# Patient Record
Sex: FEMALE | Race: WHITE | NOT HISPANIC OR LATINO | Employment: OTHER | URBAN - METROPOLITAN AREA
[De-identification: names, ages, dates, MRNs, and addresses within clinical notes are randomized per-mention and may not be internally consistent; named-entity substitution may affect disease eponyms.]

---

## 2023-04-21 ENCOUNTER — APPOINTMENT (OUTPATIENT)
Dept: GENERAL RADIOLOGY | Facility: CLINIC | Age: 88
End: 2023-04-21
Attending: EMERGENCY MEDICINE
Payer: COMMERCIAL

## 2023-04-21 ENCOUNTER — HOSPITAL ENCOUNTER (EMERGENCY)
Facility: CLINIC | Age: 88
Discharge: HOME | End: 2023-04-21
Attending: EMERGENCY MEDICINE | Admitting: EMERGENCY MEDICINE
Payer: COMMERCIAL

## 2023-04-21 VITALS
SYSTOLIC BLOOD PRESSURE: 176 MMHG | TEMPERATURE: 98.6 F | RESPIRATION RATE: 21 BRPM | OXYGEN SATURATION: 94 % | HEART RATE: 88 BPM | DIASTOLIC BLOOD PRESSURE: 95 MMHG

## 2023-04-21 DIAGNOSIS — R06.2 WHEEZING: ICD-10-CM

## 2023-04-21 LAB
ANION GAP SERPL CALCULATED.3IONS-SCNC: 11 MMOL/L (ref 7–15)
ATRIAL RATE - MUSE: 85 BPM
BASOPHILS # BLD AUTO: 0.1 10E3/UL (ref 0–0.2)
BASOPHILS NFR BLD AUTO: 1 %
BUN SERPL-MCNC: 23.8 MG/DL (ref 8–23)
CALCIUM SERPL-MCNC: 9.5 MG/DL (ref 8.8–10.2)
CHLORIDE SERPL-SCNC: 107 MMOL/L (ref 98–107)
CPB POCT: NO
CREAT SERPL-MCNC: 0.98 MG/DL (ref 0.51–0.95)
DEPRECATED HCO3 PLAS-SCNC: 29 MMOL/L (ref 22–29)
DIASTOLIC BLOOD PRESSURE - MUSE: NORMAL MMHG
EOSINOPHIL # BLD AUTO: 0.2 10E3/UL (ref 0–0.7)
EOSINOPHIL NFR BLD AUTO: 3 %
ERYTHROCYTE [DISTWIDTH] IN BLOOD BY AUTOMATED COUNT: 14.4 % (ref 10–15)
FLUAV RNA SPEC QL NAA+PROBE: NEGATIVE
FLUBV RNA RESP QL NAA+PROBE: NEGATIVE
GFR SERPL CREATININE-BSD FRML MDRD: 55 ML/MIN/1.73M2
GLUCOSE SERPL-MCNC: 115 MG/DL (ref 70–99)
HCO3 BLDV-SCNC: 31 MMOL/L (ref 21–28)
HCT VFR BLD AUTO: 50.4 % (ref 35–47)
HCT VFR BLD CALC: 47 % (ref 35–47)
HGB BLD-MCNC: 15.6 G/DL (ref 11.7–15.7)
HGB BLD-MCNC: 16 G/DL (ref 11.7–15.7)
IMM GRANULOCYTES # BLD: 0 10E3/UL
IMM GRANULOCYTES NFR BLD: 0 %
INTERPRETATION ECG - MUSE: NORMAL
LYMPHOCYTES # BLD AUTO: 1.3 10E3/UL (ref 0.8–5.3)
LYMPHOCYTES NFR BLD AUTO: 17 %
MCH RBC QN AUTO: 32 PG (ref 26.5–33)
MCHC RBC AUTO-ENTMCNC: 31 G/DL (ref 31.5–36.5)
MCV RBC AUTO: 103 FL (ref 78–100)
MONOCYTES # BLD AUTO: 0.7 10E3/UL (ref 0–1.3)
MONOCYTES NFR BLD AUTO: 9 %
NEUTROPHILS # BLD AUTO: 5.3 10E3/UL (ref 1.6–8.3)
NEUTROPHILS NFR BLD AUTO: 70 %
NRBC # BLD AUTO: 0 10E3/UL
NRBC BLD AUTO-RTO: 0 /100
P AXIS - MUSE: 14 DEGREES
PCO2 BLDV: 52 MM HG (ref 40–50)
PH BLDV: 7.39 [PH] (ref 7.32–7.43)
PLATELET # BLD AUTO: 273 10E3/UL (ref 150–450)
PO2 BLDV: 18 MM HG (ref 25–47)
POTASSIUM BLD-SCNC: 4.4 MMOL/L (ref 3.4–5.3)
POTASSIUM SERPL-SCNC: 4.3 MMOL/L (ref 3.4–5.3)
PR INTERVAL - MUSE: 158 MS
QRS DURATION - MUSE: 82 MS
QT - MUSE: 354 MS
QTC - MUSE: 421 MS
R AXIS - MUSE: -40 DEGREES
RBC # BLD AUTO: 4.88 10E6/UL (ref 3.8–5.2)
RSV RNA SPEC NAA+PROBE: NEGATIVE
SAO2 % BLDV: 25 % (ref 94–100)
SARS-COV-2 RNA RESP QL NAA+PROBE: NEGATIVE
SODIUM BLD-SCNC: 147 MMOL/L (ref 133–144)
SODIUM SERPL-SCNC: 147 MMOL/L (ref 136–145)
SYSTOLIC BLOOD PRESSURE - MUSE: NORMAL MMHG
T AXIS - MUSE: 90 DEGREES
VENTRICULAR RATE- MUSE: 85 BPM
WBC # BLD AUTO: 7.6 10E3/UL (ref 4–11)

## 2023-04-21 PROCEDURE — 71046 X-RAY EXAM CHEST 2 VIEWS: CPT

## 2023-04-21 PROCEDURE — 250N000013 HC RX MED GY IP 250 OP 250 PS 637: Performed by: EMERGENCY MEDICINE

## 2023-04-21 PROCEDURE — 84295 ASSAY OF SERUM SODIUM: CPT

## 2023-04-21 PROCEDURE — 250N000012 HC RX MED GY IP 250 OP 636 PS 637: Performed by: EMERGENCY MEDICINE

## 2023-04-21 PROCEDURE — 36415 COLL VENOUS BLD VENIPUNCTURE: CPT | Performed by: EMERGENCY MEDICINE

## 2023-04-21 PROCEDURE — C9803 HOPD COVID-19 SPEC COLLECT: HCPCS

## 2023-04-21 PROCEDURE — 85025 COMPLETE CBC W/AUTO DIFF WBC: CPT | Performed by: EMERGENCY MEDICINE

## 2023-04-21 PROCEDURE — 99285 EMERGENCY DEPT VISIT HI MDM: CPT | Mod: 25,CS

## 2023-04-21 PROCEDURE — 80048 BASIC METABOLIC PNL TOTAL CA: CPT | Performed by: EMERGENCY MEDICINE

## 2023-04-21 PROCEDURE — 87637 SARSCOV2&INF A&B&RSV AMP PRB: CPT | Performed by: EMERGENCY MEDICINE

## 2023-04-21 PROCEDURE — 93005 ELECTROCARDIOGRAM TRACING: CPT

## 2023-04-21 RX ORDER — PREDNISONE 20 MG/1
40 TABLET ORAL DAILY
Qty: 10 TABLET | Refills: 0 | Status: SHIPPED | OUTPATIENT
Start: 2023-04-21 | End: 2023-04-26

## 2023-04-21 RX ORDER — ALBUTEROL SULFATE 90 UG/1
2 AEROSOL, METERED RESPIRATORY (INHALATION) EVERY 4 HOURS PRN
Qty: 18 G | Refills: 0 | Status: SHIPPED | OUTPATIENT
Start: 2023-04-21

## 2023-04-21 RX ORDER — PREDNISONE 20 MG/1
40 TABLET ORAL ONCE
Status: COMPLETED | OUTPATIENT
Start: 2023-04-21 | End: 2023-04-21

## 2023-04-21 RX ORDER — ACETAMINOPHEN 325 MG/1
650 TABLET ORAL ONCE
Status: COMPLETED | OUTPATIENT
Start: 2023-04-21 | End: 2023-04-21

## 2023-04-21 RX ADMIN — PREDNISONE 40 MG: 20 TABLET ORAL at 03:57

## 2023-04-21 RX ADMIN — ACETAMINOPHEN 650 MG: 325 TABLET ORAL at 03:57

## 2023-04-21 ASSESSMENT — ENCOUNTER SYMPTOMS
APPETITE CHANGE: 0
COUGH: 1
FEVER: 0
WHEEZING: 1
SHORTNESS OF BREATH: 1

## 2023-04-21 ASSESSMENT — ACTIVITIES OF DAILY LIVING (ADL): ADLS_ACUITY_SCORE: 35

## 2023-04-21 NOTE — ED PROVIDER NOTES
History     Chief Complaint:  Shortness of Breath       HPI   Eros Ontiveros is a 89 year old female who presents with wheezing and shortness of breath. Eros states that about two weeks ago she noticed some shortness of breath and wheezing. She was given a prednisone treatment that she notes helped somewhat, but still reports intermittent wheezing. Earlier this evening, Eros's neighbors called EMS for Eros's  and upon their arrival EMS report that Eros was wheezing, thus prompting them to call another team for her. En route, EMS did administer one breathing treatment. During evaluation, Eros reports that the breathing treatment given did help. She also notes that she's been having a cough over the last couple of weeks but otherwise denies any fever, change in appetite or fluid intake, history of lung issues, tobacco use, or use of oxygen at baseline. Of note, Eros did receive vaccinations for COVID and pneumonia on 4/20.       Independent Historian:   EMS supplement as above    Review of External Notes: FM note 4/4/23 discussing wheezing     ROS:  Review of Systems   Constitutional: Negative for appetite change and fever.   Respiratory: Positive for cough, shortness of breath and wheezing.    All other systems reviewed and are negative.      Allergies:  Penicillins     Medications:    Allopurinol  Indomethacin     Past Medical History:    Arthritis  Opioid dependence  Obesity    Past Surgical History:    Eye surgery  Right ear skin cancer excision     Social History:  Patient arrived via EMS.  Patient is unaccompanied in the ED.  Patient denies tobacco use.  PCP: Damir Collins     Physical Exam     Patient Vitals for the past 24 hrs:   BP Temp Temp src Pulse Resp SpO2   04/21/23 0522 -- -- -- -- 21 94 %   04/21/23 0517 (!) 176/95 -- -- 88 -- --   04/21/23 0438 -- -- -- -- -- 94 %   04/21/23 0418 (!) 179/91 -- -- 81 15 --   04/21/23 0338 (!) 165/91 98.6  F (37  C) Oral 86 23 93 %         Physical Exam  General: Appears well-developed and well-nourished.   Head: No signs of trauma.   CV: Normal rate and regular rhythm.    Resp: Effort normal and breath sounds normal. No respiratory distress.   GI: Soft. There is no tenderness.  No rebound or guarding.  Normal bowel sounds.    MSK: Normal range of motion. no edema. No Calf tenderness.  Neuro: The patient is alert and oriented. Speech normal.  Skin: Skin is warm and dry. No rash noted.   Psych: normal mood and affect. behavior is normal.       Emergency Department Course   ECG  ECG taken at 0346, ECG read at 0352  Sinus rhythm with premature atrial complexes  Possible left atrial enlargement  Left axis deviation  Inferior infarct, age undetermined  Abnormal ECG   Rate 85 bpm. UT interval 158 ms. QRS duration 82 ms. QT/QTc 354/421 ms. P-R-T axes 14 -40 90.     Imaging:  XR Chest 2 Views   Final Result   IMPRESSION: Elevated right hemidiaphragm. Bibasilar atelectasis. No significant pleural effusion. No edema. No pneumothorax. Mildly enlarged cardiac silhouette.         Report per radiology    Laboratory:  Labs Ordered and Resulted from Time of ED Arrival to Time of ED Departure   BASIC METABOLIC PANEL - Abnormal       Result Value    Sodium 147 (*)     Potassium 4.3      Chloride 107      Carbon Dioxide (CO2) 29      Anion Gap 11      Urea Nitrogen 23.8 (*)     Creatinine 0.98 (*)     Calcium 9.5      Glucose 115 (*)     GFR Estimate 55 (*)    CBC WITH PLATELETS AND DIFFERENTIAL - Abnormal    WBC Count 7.6      RBC Count 4.88      Hemoglobin 15.6      Hematocrit 50.4 (*)      (*)     MCH 32.0      MCHC 31.0 (*)     RDW 14.4      Platelet Count 273      % Neutrophils 70      % Lymphocytes 17      % Monocytes 9      % Eosinophils 3      % Basophils 1      % Immature Granulocytes 0      NRBCs per 100 WBC 0      Absolute Neutrophils 5.3      Absolute Lymphocytes 1.3      Absolute Monocytes 0.7      Absolute Eosinophils 0.2      Absolute Basophils  0.1      Absolute Immature Granulocytes 0.0      Absolute NRBCs 0.0     ISTAT GASES ELECTROLYTES VENOUS POCT - Abnormal    CPB Applied No      Hematocrit POCT 47      Bicarbonate Venous POCT 31 (*)     Hemoglobin POCT 16.0 (*)     Potassium POCT 4.4      Sodium POCT 147 (*)     pCO2 Venous POCT 52 (*)     pH Venous POCT 7.39      pO2 Venous POCT 18 (*)     O2 Sat, Venous POCT 25 (*)    INFLUENZA A/B, RSV, & SARS-COV2 PCR - Normal    Influenza A PCR Negative      Influenza B PCR Negative      RSV PCR Negative      SARS CoV2 PCR Negative          Procedures   none    Emergency Department Course & Assessments:       Interventions:  Medications   acetaminophen (TYLENOL) tablet 650 mg (650 mg Oral $Given 4/21/23 0357)   predniSONE (DELTASONE) tablet 40 mg (40 mg Oral $Given 4/21/23 0357)        Assessments:  0339 I obtained history and examined the patient as noted above.   0528 I rechecked and updated the patient. At this time, the patient was deemed safe to discharge home and she agreed to the plan of care.    Independent Interpretation (X-rays, CTs, rhythm strip):  I independently reviewed the patient's chest X-ray and found no pneumothorax.     Consultations/Discussion of Management or Tests:  None        Social Determinants of Health affecting care:   None    Disposition:  The patient was discharged to home.     Impression & Plan    CMS Diagnoses: None      Medical Decision Making:  Eros Ontiveros is an 89-year-old woman who presents due to wheezing and shortness of breath. An ambulance had actually been called for her , but the EMS crew had noted that she seemed somewhat short breath and was having some wheezing so she was transported as well.  Patient had recently had a viral upper respiratory infection and completed a course of prednisone and had been doing better.  Yesterday she received vaccines.  At the time of my evaluation the patient appeared well.  She had received a breathing treatment with EMS and  she states that this had helped.  Her vital signs were appropriate. I did obtain screening blood work along with a chest x-ray, EKG, and COVID swab and these were all reassuring. The patient was not having any CO2 retention.  I believe that she is likely having some reactive airway disease secondary to the vaccines.  I do not believe this is an allergic reaction or other life-threatening process.  Patient was discharged home with a prescription for an albuterol inhaler.  I also gave a prescription for some additional prednisone, but discussed holding onto it as her symptoms may resolve quite quickly if it is related to the vaccine, but if they do persist in the next days, she will have that available to her.  She is recommended follow-up in clinic return to the ER for any further concerns    Diagnosis:    ICD-10-CM    1. Wheezing  R06.2            Discharge Medications:  Discharge Medication List as of 4/21/2023  5:28 AM      START taking these medications    Details   albuterol (PROAIR HFA) 108 (90 Base) MCG/ACT inhaler Inhale 2 puffs into the lungs every 4 hours as needed for shortness of breath, Disp-18 g, R-0, E-Prescribe      predniSONE (DELTASONE) 20 MG tablet Take 2 tablets (40 mg) by mouth daily for 5 days, Disp-10 tablet, R-0, Local Print              Scribe Disclosure:  I, Greer Smith, am serving as a scribe at 3:43 AM on 4/21/2023 to document services personally performed by Damir Rizvi MD based on my observations and the provider's statements to me.     4/21/2023   Damir Rizvi MD Bergenstal, John A, MD  04/21/23 0990

## 2023-04-21 NOTE — DISCHARGE INSTRUCTIONS
I believe your symptoms are related to your vaccine.  Use the inhaler every 4 hours as needed for wheezing or shortness of breath.  If the wheezing continues, you can fill the prednisone for continued relief.  Return to the ER for worsening breathing or any further concerns.

## 2023-04-21 NOTE — ED NOTES
Bed: [facility]  Expected date: 4/21/23  Expected time: 3:35 AM  Means of arrival: Ambulance  Comments:  Ike [phone] [id number] resp. distress

## 2023-04-21 NOTE — ED TRIAGE NOTES
Pt BIBA from home. She received the covid booster and pneumonia vaccine yesterday. Her , who also got the booster, got up in the middle of the night to use the bathroom and their neighbors called 911 for him because he was c/o SOB. On EMS arrival they noted her to have inspiratory and expiratory wheezes so another rig was called for her. Nebulizer given en route with some relief. Pt states she has not had SOB just a little wheezing and cough. VSS

## 2024-01-11 ENCOUNTER — APPOINTMENT (OUTPATIENT)
Dept: GENERAL RADIOLOGY | Facility: CLINIC | Age: 89
DRG: 193 | End: 2024-01-11
Attending: EMERGENCY MEDICINE
Payer: COMMERCIAL

## 2024-01-11 ENCOUNTER — APPOINTMENT (OUTPATIENT)
Dept: CARDIOLOGY | Facility: CLINIC | Age: 89
DRG: 193 | End: 2024-01-11
Payer: COMMERCIAL

## 2024-01-11 ENCOUNTER — APPOINTMENT (OUTPATIENT)
Dept: CT IMAGING | Facility: CLINIC | Age: 89
DRG: 193 | End: 2024-01-11
Payer: COMMERCIAL

## 2024-01-11 ENCOUNTER — HOSPITAL ENCOUNTER (INPATIENT)
Facility: CLINIC | Age: 89
LOS: 11 days | Discharge: HOME HEALTH | DRG: 193 | End: 2024-01-22
Attending: EMERGENCY MEDICINE | Admitting: STUDENT IN AN ORGANIZED HEALTH CARE EDUCATION/TRAINING PROGRAM
Payer: COMMERCIAL

## 2024-01-11 DIAGNOSIS — J96.01 ACUTE RESPIRATORY FAILURE WITH HYPOXIA AND HYPERCAPNIA (H): ICD-10-CM

## 2024-01-11 DIAGNOSIS — J96.02 ACUTE RESPIRATORY FAILURE WITH HYPOXIA AND HYPERCAPNIA (H): ICD-10-CM

## 2024-01-11 DIAGNOSIS — J18.9 COMMUNITY ACQUIRED PNEUMONIA, BILATERAL: ICD-10-CM

## 2024-01-11 LAB
ALBUMIN SERPL BCG-MCNC: 3.2 G/DL (ref 3.5–5.2)
ALBUMIN UR-MCNC: NEGATIVE MG/DL
ALP SERPL-CCNC: 80 U/L (ref 40–150)
ALT SERPL W P-5'-P-CCNC: 28 U/L (ref 0–50)
ANION GAP SERPL CALCULATED.3IONS-SCNC: 7 MMOL/L (ref 7–15)
APPEARANCE UR: CLEAR
AST SERPL W P-5'-P-CCNC: 87 U/L (ref 0–45)
ATRIAL RATE - MUSE: 73 BPM
BASE EXCESS BLDV CALC-SCNC: 9 MMOL/L (ref -7.7–1.9)
BASOPHILS # BLD AUTO: 0 10E3/UL (ref 0–0.2)
BASOPHILS NFR BLD AUTO: 0 %
BILIRUB SERPL-MCNC: 0.4 MG/DL
BILIRUB UR QL STRIP: NEGATIVE
BUN SERPL-MCNC: 41.5 MG/DL (ref 8–23)
CALCIUM SERPL-MCNC: 9 MG/DL (ref 8.2–9.6)
CHLORIDE SERPL-SCNC: 109 MMOL/L (ref 98–107)
COLOR UR AUTO: ABNORMAL
CREAT SERPL-MCNC: 1.28 MG/DL (ref 0.51–0.95)
DEPRECATED HCO3 PLAS-SCNC: 33 MMOL/L (ref 22–29)
DIASTOLIC BLOOD PRESSURE - MUSE: NORMAL MMHG
EGFRCR SERPLBLD CKD-EPI 2021: 40 ML/MIN/1.73M2
EOSINOPHIL # BLD AUTO: 0 10E3/UL (ref 0–0.7)
EOSINOPHIL NFR BLD AUTO: 0 %
ERYTHROCYTE [DISTWIDTH] IN BLOOD BY AUTOMATED COUNT: 16.9 % (ref 10–15)
FLUAV RNA SPEC QL NAA+PROBE: NEGATIVE
FLUBV RNA RESP QL NAA+PROBE: NEGATIVE
GLUCOSE BLDC GLUCOMTR-MCNC: 129 MG/DL (ref 70–99)
GLUCOSE BLDC GLUCOMTR-MCNC: 64 MG/DL (ref 70–99)
GLUCOSE SERPL-MCNC: 102 MG/DL (ref 70–99)
GLUCOSE UR STRIP-MCNC: NEGATIVE MG/DL
HCO3 BLDV-SCNC: 35 MMOL/L (ref 21–28)
HCO3 BLDV-SCNC: 35 MMOL/L (ref 21–28)
HCO3 BLDV-SCNC: 36 MMOL/L (ref 21–28)
HCO3 BLDV-SCNC: 37 MMOL/L (ref 21–28)
HCO3 BLDV-SCNC: 38 MMOL/L (ref 21–28)
HCT VFR BLD AUTO: 43.6 % (ref 35–47)
HGB BLD-MCNC: 12.7 G/DL (ref 11.7–15.7)
HGB UR QL STRIP: ABNORMAL
IMM GRANULOCYTES # BLD: 0.5 10E3/UL
IMM GRANULOCYTES NFR BLD: 4 %
INTERPRETATION ECG - MUSE: NORMAL
KETONES UR STRIP-MCNC: NEGATIVE MG/DL
LACTATE BLD-SCNC: 0.9 MMOL/L
LACTATE BLD-SCNC: 1 MMOL/L
LACTATE BLD-SCNC: 1 MMOL/L
LACTATE BLD-SCNC: 1.4 MMOL/L
LEUKOCYTE ESTERASE UR QL STRIP: NEGATIVE
LVEF ECHO: NORMAL
LYMPHOCYTES # BLD AUTO: 0.7 10E3/UL (ref 0.8–5.3)
LYMPHOCYTES NFR BLD AUTO: 6 %
MCH RBC QN AUTO: 31.1 PG (ref 26.5–33)
MCHC RBC AUTO-ENTMCNC: 29.1 G/DL (ref 31.5–36.5)
MCV RBC AUTO: 107 FL (ref 78–100)
MONOCYTES # BLD AUTO: 0.9 10E3/UL (ref 0–1.3)
MONOCYTES NFR BLD AUTO: 7 %
NEUTROPHILS # BLD AUTO: 9.8 10E3/UL (ref 1.6–8.3)
NEUTROPHILS NFR BLD AUTO: 83 %
NITRATE UR QL: NEGATIVE
NRBC # BLD AUTO: 0.3 10E3/UL
NRBC BLD AUTO-RTO: 2 /100
NT-PROBNP SERPL-MCNC: 9857 PG/ML (ref 0–1800)
O2/TOTAL GAS SETTING VFR VENT: 40 %
P AXIS - MUSE: 45 DEGREES
PCO2 BLDV: 68 MM HG (ref 40–50)
PCO2 BLDV: 69 MM HG (ref 40–50)
PCO2 BLDV: 73 MM HG (ref 40–50)
PCO2 BLDV: 77 MM HG (ref 40–50)
PCO2 BLDV: 80 MM HG (ref 40–50)
PH BLDV: 7.25 [PH] (ref 7.32–7.43)
PH BLDV: 7.26 [PH] (ref 7.32–7.43)
PH BLDV: 7.32 [PH] (ref 7.32–7.43)
PH BLDV: 7.32 [PH] (ref 7.32–7.43)
PH BLDV: 7.35 [PH] (ref 7.32–7.43)
PH UR STRIP: 5 [PH] (ref 5–7)
PLATELET # BLD AUTO: 576 10E3/UL (ref 150–450)
PO2 BLDV: 22 MM HG (ref 25–47)
PO2 BLDV: 22 MM HG (ref 25–47)
PO2 BLDV: 25 MM HG (ref 25–47)
PO2 BLDV: 39 MM HG (ref 25–47)
PO2 BLDV: 40 MM HG (ref 25–47)
POTASSIUM SERPL-SCNC: 4.6 MMOL/L (ref 3.4–5.3)
PR INTERVAL - MUSE: 152 MS
PROCALCITONIN SERPL IA-MCNC: 0.37 NG/ML
PROT SERPL-MCNC: 7.7 G/DL (ref 6.4–8.3)
QRS DURATION - MUSE: 84 MS
QT - MUSE: 412 MS
QTC - MUSE: 453 MS
R AXIS - MUSE: -62 DEGREES
RBC # BLD AUTO: 4.08 10E6/UL (ref 3.8–5.2)
RBC URINE: 4 /HPF
RSV RNA SPEC NAA+PROBE: NEGATIVE
SAO2 % BLDV: 31 % (ref 94–100)
SAO2 % BLDV: 34 % (ref 94–100)
SAO2 % BLDV: 62 % (ref 94–100)
SAO2 % BLDV: 68 % (ref 94–100)
SARS-COV-2 RNA RESP QL NAA+PROBE: NEGATIVE
SODIUM SERPL-SCNC: 149 MMOL/L (ref 135–145)
SP GR UR STRIP: 1.02 (ref 1–1.03)
SQUAMOUS EPITHELIAL: <1 /HPF
SYSTOLIC BLOOD PRESSURE - MUSE: NORMAL MMHG
T AXIS - MUSE: 99 DEGREES
T4 FREE SERPL-MCNC: 0.69 NG/DL (ref 0.9–1.7)
TROPONIN T SERPL HS-MCNC: 125 NG/L
TROPONIN T SERPL HS-MCNC: 129 NG/L
TROPONIN T SERPL HS-MCNC: 146 NG/L
TSH SERPL DL<=0.005 MIU/L-ACNC: 5.2 UIU/ML (ref 0.3–4.2)
UROBILINOGEN UR STRIP-MCNC: NORMAL MG/DL
VENTRICULAR RATE- MUSE: 73 BPM
VIT B12 SERPL-MCNC: 494 PG/ML (ref 232–1245)
WBC # BLD AUTO: 11.9 10E3/UL (ref 4–11)
WBC URINE: 1 /HPF

## 2024-01-11 PROCEDURE — 84443 ASSAY THYROID STIM HORMONE: CPT

## 2024-01-11 PROCEDURE — 84484 ASSAY OF TROPONIN QUANT: CPT

## 2024-01-11 PROCEDURE — 258N000001 HC RX 258: Performed by: HOSPITALIST

## 2024-01-11 PROCEDURE — 84439 ASSAY OF FREE THYROXINE: CPT

## 2024-01-11 PROCEDURE — 36415 COLL VENOUS BLD VENIPUNCTURE: CPT

## 2024-01-11 PROCEDURE — 74177 CT ABD & PELVIS W/CONTRAST: CPT

## 2024-01-11 PROCEDURE — 99223 1ST HOSP IP/OBS HIGH 75: CPT

## 2024-01-11 PROCEDURE — 93306 TTE W/DOPPLER COMPLETE: CPT | Mod: 26 | Performed by: INTERNAL MEDICINE

## 2024-01-11 PROCEDURE — 999N000185 HC STATISTIC TRANSPORT TIME EA 15 MIN

## 2024-01-11 PROCEDURE — 83880 ASSAY OF NATRIURETIC PEPTIDE: CPT | Performed by: EMERGENCY MEDICINE

## 2024-01-11 PROCEDURE — 99223 1ST HOSP IP/OBS HIGH 75: CPT | Mod: FS | Performed by: NURSE PRACTITIONER

## 2024-01-11 PROCEDURE — 5A09357 ASSISTANCE WITH RESPIRATORY VENTILATION, LESS THAN 24 CONSECUTIVE HOURS, CONTINUOUS POSITIVE AIRWAY PRESSURE: ICD-10-PCS | Performed by: STUDENT IN AN ORGANIZED HEALTH CARE EDUCATION/TRAINING PROGRAM

## 2024-01-11 PROCEDURE — 96365 THER/PROPH/DIAG IV INF INIT: CPT | Mod: 59

## 2024-01-11 PROCEDURE — 258N000002 HC RX IP 258 OP 250

## 2024-01-11 PROCEDURE — 999N000208 ECHOCARDIOGRAM COMPLETE

## 2024-01-11 PROCEDURE — 80053 COMPREHEN METABOLIC PANEL: CPT | Performed by: EMERGENCY MEDICINE

## 2024-01-11 PROCEDURE — 36415 COLL VENOUS BLD VENIPUNCTURE: CPT | Performed by: EMERGENCY MEDICINE

## 2024-01-11 PROCEDURE — 94660 CPAP INITIATION&MGMT: CPT

## 2024-01-11 PROCEDURE — 250N000011 HC RX IP 250 OP 636: Performed by: STUDENT IN AN ORGANIZED HEALTH CARE EDUCATION/TRAINING PROGRAM

## 2024-01-11 PROCEDURE — 96367 TX/PROPH/DG ADDL SEQ IV INF: CPT

## 2024-01-11 PROCEDURE — 82607 VITAMIN B-12: CPT

## 2024-01-11 PROCEDURE — 87040 BLOOD CULTURE FOR BACTERIA: CPT

## 2024-01-11 PROCEDURE — 81001 URINALYSIS AUTO W/SCOPE: CPT

## 2024-01-11 PROCEDURE — 82803 BLOOD GASES ANY COMBINATION: CPT

## 2024-01-11 PROCEDURE — 255N000002 HC RX 255 OP 636: Performed by: STUDENT IN AN ORGANIZED HEALTH CARE EDUCATION/TRAINING PROGRAM

## 2024-01-11 PROCEDURE — 84484 ASSAY OF TROPONIN QUANT: CPT | Performed by: HOSPITALIST

## 2024-01-11 PROCEDURE — 250N000009 HC RX 250: Performed by: STUDENT IN AN ORGANIZED HEALTH CARE EDUCATION/TRAINING PROGRAM

## 2024-01-11 PROCEDURE — 84145 PROCALCITONIN (PCT): CPT

## 2024-01-11 PROCEDURE — 85025 COMPLETE CBC W/AUTO DIFF WBC: CPT | Performed by: EMERGENCY MEDICINE

## 2024-01-11 PROCEDURE — 87637 SARSCOV2&INF A&B&RSV AMP PRB: CPT | Performed by: EMERGENCY MEDICINE

## 2024-01-11 PROCEDURE — 999N000157 HC STATISTIC RCP TIME EA 10 MIN

## 2024-01-11 PROCEDURE — 84484 ASSAY OF TROPONIN QUANT: CPT | Performed by: EMERGENCY MEDICINE

## 2024-01-11 PROCEDURE — 87040 BLOOD CULTURE FOR BACTERIA: CPT | Performed by: EMERGENCY MEDICINE

## 2024-01-11 PROCEDURE — 99285 EMERGENCY DEPT VISIT HI MDM: CPT | Mod: 25

## 2024-01-11 PROCEDURE — 99207 PR NO BILLABLE SERVICE THIS VISIT: CPT | Performed by: STUDENT IN AN ORGANIZED HEALTH CARE EDUCATION/TRAINING PROGRAM

## 2024-01-11 PROCEDURE — 250N000011 HC RX IP 250 OP 636: Performed by: EMERGENCY MEDICINE

## 2024-01-11 PROCEDURE — 71045 X-RAY EXAM CHEST 1 VIEW: CPT

## 2024-01-11 PROCEDURE — 120N000013 HC R&B IMCU

## 2024-01-11 PROCEDURE — 93005 ELECTROCARDIOGRAM TRACING: CPT

## 2024-01-11 PROCEDURE — 250N000011 HC RX IP 250 OP 636

## 2024-01-11 RX ORDER — AZITHROMYCIN 500 MG/1
500 INJECTION, POWDER, LYOPHILIZED, FOR SOLUTION INTRAVENOUS ONCE
Status: COMPLETED | OUTPATIENT
Start: 2024-01-11 | End: 2024-01-11

## 2024-01-11 RX ORDER — ACETAMINOPHEN 650 MG/1
650 SUPPOSITORY RECTAL EVERY 4 HOURS PRN
Status: DISCONTINUED | OUTPATIENT
Start: 2024-01-11 | End: 2024-01-22 | Stop reason: HOSPADM

## 2024-01-11 RX ORDER — LIDOCAINE 40 MG/G
CREAM TOPICAL
Status: DISCONTINUED | OUTPATIENT
Start: 2024-01-11 | End: 2024-01-14

## 2024-01-11 RX ORDER — LIDOCAINE 40 MG/G
CREAM TOPICAL
Status: DISCONTINUED | OUTPATIENT
Start: 2024-01-11 | End: 2024-01-22 | Stop reason: HOSPADM

## 2024-01-11 RX ORDER — CARBOXYMETHYLCELLULOSE SODIUM 5 MG/ML
1 SOLUTION/ DROPS OPHTHALMIC
Status: DISCONTINUED | OUTPATIENT
Start: 2024-01-11 | End: 2024-01-22 | Stop reason: HOSPADM

## 2024-01-11 RX ORDER — GUAIFENESIN 200 MG/10ML
200 LIQUID ORAL EVERY 4 HOURS PRN
Status: DISCONTINUED | OUTPATIENT
Start: 2024-01-11 | End: 2024-01-22 | Stop reason: HOSPADM

## 2024-01-11 RX ORDER — CEFTRIAXONE 1 G/1
1 INJECTION, POWDER, FOR SOLUTION INTRAMUSCULAR; INTRAVENOUS EVERY 24 HOURS
Status: DISCONTINUED | OUTPATIENT
Start: 2024-01-12 | End: 2024-01-13

## 2024-01-11 RX ORDER — CEFTRIAXONE 1 G/1
1 INJECTION, POWDER, FOR SOLUTION INTRAMUSCULAR; INTRAVENOUS ONCE
Status: COMPLETED | OUTPATIENT
Start: 2024-01-11 | End: 2024-01-11

## 2024-01-11 RX ORDER — NICOTINE POLACRILEX 4 MG
15-30 LOZENGE BUCCAL
Status: DISCONTINUED | OUTPATIENT
Start: 2024-01-11 | End: 2024-01-22 | Stop reason: HOSPADM

## 2024-01-11 RX ORDER — BENZONATATE 100 MG/1
100 CAPSULE ORAL 3 TIMES DAILY PRN
Status: DISCONTINUED | OUTPATIENT
Start: 2024-01-11 | End: 2024-01-22 | Stop reason: HOSPADM

## 2024-01-11 RX ORDER — CALCIUM CARBONATE 500 MG/1
1000 TABLET, CHEWABLE ORAL 4 TIMES DAILY PRN
Status: DISCONTINUED | OUTPATIENT
Start: 2024-01-11 | End: 2024-01-22 | Stop reason: HOSPADM

## 2024-01-11 RX ORDER — FUROSEMIDE 10 MG/ML
20 INJECTION INTRAMUSCULAR; INTRAVENOUS ONCE
Status: COMPLETED | OUTPATIENT
Start: 2024-01-11 | End: 2024-01-11

## 2024-01-11 RX ORDER — SODIUM CHLORIDE 450 MG/100ML
INJECTION, SOLUTION INTRAVENOUS CONTINUOUS
Status: DISCONTINUED | OUTPATIENT
Start: 2024-01-11 | End: 2024-01-11

## 2024-01-11 RX ORDER — IOPAMIDOL 755 MG/ML
91 INJECTION, SOLUTION INTRAVASCULAR ONCE
Status: COMPLETED | OUTPATIENT
Start: 2024-01-11 | End: 2024-01-11

## 2024-01-11 RX ORDER — ALLOPURINOL 300 MG/1
300 TABLET ORAL DAILY
COMMUNITY

## 2024-01-11 RX ORDER — AMOXICILLIN 250 MG
2 CAPSULE ORAL 2 TIMES DAILY PRN
Status: DISCONTINUED | OUTPATIENT
Start: 2024-01-11 | End: 2024-01-22 | Stop reason: HOSPADM

## 2024-01-11 RX ORDER — NITROGLYCERIN 0.4 MG/1
0.4 TABLET SUBLINGUAL EVERY 5 MIN PRN
Status: DISCONTINUED | OUTPATIENT
Start: 2024-01-11 | End: 2024-01-14

## 2024-01-11 RX ORDER — AMOXICILLIN 250 MG
1 CAPSULE ORAL 2 TIMES DAILY PRN
Status: DISCONTINUED | OUTPATIENT
Start: 2024-01-11 | End: 2024-01-22 | Stop reason: HOSPADM

## 2024-01-11 RX ORDER — LIDOCAINE 40 MG/G
CREAM TOPICAL
Status: CANCELLED | OUTPATIENT
Start: 2024-01-11

## 2024-01-11 RX ORDER — ACETAMINOPHEN 325 MG/1
650 TABLET ORAL EVERY 4 HOURS PRN
Status: DISCONTINUED | OUTPATIENT
Start: 2024-01-11 | End: 2024-01-22 | Stop reason: HOSPADM

## 2024-01-11 RX ORDER — IPRATROPIUM BROMIDE AND ALBUTEROL SULFATE 2.5; .5 MG/3ML; MG/3ML
3 SOLUTION RESPIRATORY (INHALATION)
Status: DISCONTINUED | OUTPATIENT
Start: 2024-01-11 | End: 2024-01-14

## 2024-01-11 RX ORDER — INDOMETHACIN 25 MG/1
25 CAPSULE ORAL 2 TIMES DAILY WITH MEALS
Status: ON HOLD | COMMUNITY
Start: 2023-11-02 | End: 2024-01-21

## 2024-01-11 RX ORDER — DEXTROSE MONOHYDRATE 25 G/50ML
25-50 INJECTION, SOLUTION INTRAVENOUS
Status: DISCONTINUED | OUTPATIENT
Start: 2024-01-11 | End: 2024-01-22 | Stop reason: HOSPADM

## 2024-01-11 RX ADMIN — SODIUM CHLORIDE 68 ML: 9 INJECTION, SOLUTION INTRAVENOUS at 15:13

## 2024-01-11 RX ADMIN — FUROSEMIDE 20 MG: 10 INJECTION, SOLUTION INTRAMUSCULAR; INTRAVENOUS at 16:16

## 2024-01-11 RX ADMIN — AZITHROMYCIN MONOHYDRATE 500 MG: 500 INJECTION, POWDER, LYOPHILIZED, FOR SOLUTION INTRAVENOUS at 11:25

## 2024-01-11 RX ADMIN — IOPAMIDOL 91 ML: 755 INJECTION, SOLUTION INTRAVENOUS at 15:14

## 2024-01-11 RX ADMIN — SODIUM CHLORIDE: 4.5 INJECTION, SOLUTION INTRAVENOUS at 13:56

## 2024-01-11 RX ADMIN — HUMAN ALBUMIN MICROSPHERES AND PERFLUTREN 9 ML: 10; .22 INJECTION, SOLUTION INTRAVENOUS at 13:43

## 2024-01-11 RX ADMIN — DEXTROSE MONOHYDRATE 25 ML: 25 INJECTION, SOLUTION INTRAVENOUS at 22:40

## 2024-01-11 RX ADMIN — CEFTRIAXONE SODIUM 1 G: 1 INJECTION, POWDER, FOR SOLUTION INTRAMUSCULAR; INTRAVENOUS at 10:56

## 2024-01-11 ASSESSMENT — ACTIVITIES OF DAILY LIVING (ADL)
ADLS_ACUITY_SCORE: 35

## 2024-01-11 NOTE — ED NOTES
[facility]  ED Nurse Handoff Report    ED Chief complaint: Shortness of Breath      ED Diagnosis:   Final diagnoses:   Acute respiratory failure with hypoxia (H)   Community acquired pneumonia, bilateral       Code Status: MD to discuss    Allergies:   Allergies   Allergen Reactions    Penicillins Unknown       Patient Story: Patient comes in hypoxic with saturations 60%. According to family she has had SOB and a cough for 3 weeks. EMS had put her on 15L of NRB. Here in the ED she was placed on Bipap.   Focused Assessment:    Cardiac- NSR. BNP and trops are elevated.   Neuro- Somnolent, forgetful, disoriented to time.  Respiratory- SOB, accessory muscle use, on Bipap     Treatments and/or interventions provided: Abx, imaging, lab work, Bipap.   Patient's response to treatments and/or interventions: Tolerated well.    To be done/followed up on inpatient unit:  Per MD orders.    Does this patient have any cognitive concerns?: Forgetful    Activity level - Baseline/Home:  Independent  Activity Level - Current:   Unknown    Patient's Preferred language: English   Needed?: No    Isolation: None  Infection: Not Applicable  Patient tested for COVID 19 prior to admission: YES  Bariatric?: No    Vital Signs:   Vitals:    01/11/24 1030 01/11/24 1100 01/11/24 1130 01/11/24 1200   BP: (!) 153/113 (!) 155/94 (!) 148/103 (!) 143/83   Pulse: 65 60 70 73   Resp: (!) 33 30 (!) 33 (!) 32   Temp:       TempSrc:       SpO2: 98% 94% 96% 94%       Cardiac Rhythm:Cardiac Rhythm: Normal sinus rhythm    Was the PSS-3 completed:   Yes  What interventions are required if any?     N/a          Family Comments:  and daughter at bedside.   OBS brochure/video discussed/provided to patient/family: N/A    For the majority of the shift this patient's behavior was Green.   Behavioral interventions performed were n/a.    ED NURSE PHONE NUMBER: [phone]

## 2024-01-11 NOTE — H&P
[facility]    History and Physical - Hospitalist Service       Date of Admission:  1/11/2024    Assessment & Plan      Eros Ontiveros is a 89 year old female with past medical history significant for arthritis who was admitted on 1/11/2024 for further evaluation of shortness of breath.     Patient is hypertensive upon arrival to the ED with a BP of 147/100 and intermittently tachypneic with a RR 33 requiring 3-5 LPM. EMS report she had oxygenations in 62% ORA upon arrival. She was placed on a 15L non-rebreather en route. She reports three weeks of progressively worsening SOB and productive cough (green sputum). Associated symptoms include worsening lower extremity edema, decreased appetite, fatigue, altered mental status (confusion and disorientation started on 01/08) and hematuria. Denies recent fevers, chest pain, N/V, abdominal pain, bowel changes or recent syncopal episodes. Last bowel movement 01/10. Family unclear if this bowel movement was normal. Granddaughter notes patient was seen in a virtual visit approximately one week ago for her worsening shortness of breath where she was prescribed a Z-pack. She completed the course of antibiotics without relief of her symptoms. Granddaughter states patient has had an unintentional weight loss of 25 lbs since Thanksgiving. She also states patient has a several year history of exertional shortness of breath and LE edema at her baseline that has never been fully evaluated. Patient is noted to have had minimal PO intake since 01/05. Labs in the ED notable for a BNP 9,857. Trop 146. EKG sinus with PACs. Leukocytosis and thrombocytosis noted with a WBC 11.9 and . Negative lactate. BMP notable for a Na 149, Cl 109, Cr 1.28, BUN 41.5, GFR 40, albumin 3.2 and AST 87. VBGs upon arrival notable for a pH 7.32, pCO2 73, Bicarb 38. COVID, Influenza and RSV negative. CXR demonstrated new dense consolidation at the right upper lobe. Increased  consolidation at the left lung base as well that may also represent airspace consolidation. Small bibasilar pleural fluid. Normal cardiac silhouette. Bilateral shoulder DJD. She was started on ceftriaxone and azithromycin in the ED.     Upon chart review, patient appears to have had concerns of cough and wheezing since at least 04/2023. VBGs at that time demonstrated a pH 7.39, pCO2 52, Bicarb 31. She is also noted to have a history of pedal edema for which she was on 20 mg lasix at one point. No known cardiac history, per daughter.     Acute Hypoxic/Hypercapnic Respiratory Failure   Community Acquired Pneumonia, bilateral   Acute Toxic Metabolic encephalopathy   Generalized Weakness   Leukocytosis   *Baseline patient doesn't require supplemental oxygen. Initially found by EMS with SpO2 62% ORA and started on 15L non-rebreather en route. Currently requiring BiPAP. Currently at 40% FiO2.  *Several week history of cough and shortness of breath. Upon chart review, may be closer to a year. WBC 11.9. Lactate negative. CXR demonstrated new dense consolidation at the right upper lobe. Increased consolidation at the left lung base as well that may also represent airspace consolidation. Small bibasilar pleural fluid.   - Admit to IMC.   - Continue ceftriaxone and azithromycin.   - Start BiPAP. Currently at 40% FiO2. Repeat VBG pH 7.25, pCO2 80, Bicarb 35. Again repeated and pH 7.32, pCO2 69 and Bicarb 36. VBGs q 6 hrs or sooner as needed for now.   - RT consulted, appreciate the cares.   - Respiratory assessment score q 4 hours.   - Procalcitonin 0.37. Repeat lactates obtained and negative.   - DuoNebs scheduled, antitussives PRN.   - CT PE 01/11 ordered and demonstrated no pulmonary emboli. Rounded area of consolidation in the right upper lobe measuring about 4.5 cm could represent pneumonia with a possible underlying mass. Additional areas of groundglass and patchy opacities in the right lung, likely due to pneumonia.  Small bilateral pleural effusions. Recommend a follow-up chest CT in 8-12 weeks to ensure resolution.   - Continue to monitor for improvement and consider consulting pulmonology if respiratory status continues to decline. Consider CT chest in 1-2 months to assess mass.   - PT/OT/SW. Granddaughter reports should patient clinically improve, she would like to discuss [facility] longer term. Notes that grandfather would likely disagree and family needs to have discussion about longer term planning in coming days.   - Obtain morning CBC.     Severe LVH w/ No LV Outflow Obstruction   Concern for HCM vs cardiac amyloid  Elevated Troponins - suspect demand ischemia   LE Edema  Hypertension    Moderate Aortic Stenosis   *Patient has no known cardiac history, per family, and takes no cardiac medications currently. Upon chart review, has taken lasix in the past.   - Repeat troponin downtrending at 129.   - Echocardiogram 01/11 demonstrated the left ventricle is normal in size. There is severe concentric left ventricular hypertrophy, with evidence for asymmetric septal hypertrophy. Septal thickness measured up to 2.5 cm. LVOT obstruction was not identified. Left ventricular systolic function is normal. The visual ejection fraction is 60-65%. Diastolic Doppler findings (E/E' ratio and/or other parameters) suggest left ventricular filling pressures are increased. Moderate valvular aortic stenosis. Mean AV gradient 20 mmHg, RODRIGUEZ 1.4 cm2. IVC diameter and respiratory changes fall into an intermediate range suggesting an RA pressure of 8 mmHg. Consider infiltrative CM and hypertrophic CM.  - Cardiology consulted given echocardiogram findings, appreciate the cares. 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 No family history of hypertrophic cardiomyopathy and kids are all adopted  Her sibs no HCM. Cardiology to repeat echo with strain imaging and if apical sparing seen it would  be c/w amyloid which would also explain troponin. Per cardiology, given age, likely would not do work-up for HCM but could reconsider.   - Per above, initially cautiously started IV fluids, however, given findings from echocardiogram and bilateral pleural effusions on CT PE that suggested mild volume overload, discontinued fluids and gave patient dose of lasix IV 20 mg.   - Monitor daily weights (standing).  - Strict I's and O's monitored.  - Continue to trend with another troponin.   - Monitor for response to diuresis.      Hypoalbuminemia   *3.2 01/11. Patient noted to have lost 25 lbs since Thanksgiving. She hasn't had much intake over last seven days.   - Consulted nutrition.   - Continue to monitor with morning CMP.     BRANDT   Hematuria  *Cr 1.28, BUN 41.5. (Cr 0.98  and BUN 23.8 04/2023).  *CT PE obtained 01/11 that demonstrated an indeterminate lesion of the lower pole of the right kidney measuring 1.4 cm could represent a cyst with proteinaceous or hemorrhagic material or solid mass.   *Patient noted to have low urine output until lasix given. Purewick in place for voiding needs.    *Patient's  reports recent hematuria.   - Cautiously started IVF, 0.45% NS 50 ml/hr for 10 hours. Discontinued after receiving findings from echocardiogram and bilateral pleural effusions on CT PE that suggested mild volume overload, likely from third spacing, and gave one dose 20 mg IV lasix.  - UA pending.   - Continue to monitor with morning CMP, particularly as patient received contrast dye and lasix. Once patient clinically improves, consider non emergent renal ultrasound is suggested for further evaluation and nephrology referral.    CT concerning for endometrial malignancy   *CT PE imaging 01/11 suspicious for endometrial malignancy with nodular and calcified enhancing areas in the endometrium and endometrial canal expanded to 37 mm.   - Pelvic ultrasound is suggested for further evaluation. Consider pelvic US and  hem/onc referral once patient clinically improves.     Possible Colitis   *Patient family reports patient last had bowel movement 01/10. Unclear if changes to bowel movements. Patient's mental status diminished, however, denied pain and no focal tenderness upon examination.  *CT PE 01/11 demonstrated possible mild colitis involving the cecum, ascending colon and transverse colon, either infectious or inflammatory.   - Continue to monitor. Consider GI consult once patient clinically improves.     Hypernatremia   Hyperchloremia   *Na 149. Cl 109.   - Noted. Continue to monitor with morning CMP.     Elevated Liver Enzymes   *AST 87 01/11.   *Patient non-drinker and does not consume drugs. Patient's mental status diminished, however, denied pain and no focal tenderness upon examination.  *Upon chart review, patient has history of elevated liver enzymes. RUQ US 08/2012 demonstrated liver is echogenic consistent with fatty infiltration.   - Continue to monitor with morning CMP. Consider RUQ US once patient clinically improves.     Thrombocytosis   *. Suspect reactive thrombocytosis due to chronic inflammation vs infection vs malignancy vs other.   - Continue to monitor with morning CBC.     Macrocytosis   * 01/11.   - Obtaining Vitamin B12 and folate. Continue to monitor.     Recurrent Basal Cell Carcinoma of Right Pinna   *Mohs surgery 05/2023.   - Noted.     Osteoarthritis   *Shoulders, knees and hands. Hx of use of cane and wheelchair while ambulating.   *Limited mobility at baseline.   - Hold PTA indomethacin until patient clinically improves.      Gout   - Hold PTA allopurinol until patient clinically improves.       DNR-DNI Status   Goals of Care   *Discussed with patient's family and patient wishes to be DNR-DNI at this time.   *Initial goals of care discussion with granddaughter. Unfortunately patient's  was not part of the discussion and granddaughter did admit further discussions between the  family would need to take place prior to finalizing because they are likely not on the same page, however, she did say that she is POA and that she believes her grandmother would prefer to not pursue invasive treatments and procedures at this point. She would like to still consider minimally invasive procedures depending on the risks and benefits and also consider certain medications, such as anti-hypertensives. She notes that her grandmother recently had a Mohs procedure done on her ear and even that seemed to be a lengthy recovery for her, thus she wants to be mindful about the cares she receives. She notes that she will need to discuss this further with her grandfather because again he will likely want to pursue any and all interventions. Granddaughter though noted she is not interested in true hospice or comfort cares at this point in time.   - PT/OT/SW. Granddaughter reports should patient clinically improve, she would like to discuss correction longer term. Notes that grandfather would likely disagree and family needs to have discussion about longer term planning in coming days.   - Consider palliative care consult for assistance in discussion with patient, patient's  and granddaughter pending patient's clinical status in coming days.         Diet: NPO for Medical/Clinical Reasons Except for: Meds  DVT Prophylaxis: Pneumatic Compression Devices  Mcqueen Catheter: Not present  Lines: None     Cardiac Monitoring: None  Code Status: No CPR- Do NOT Intubate    Disposition Plan      Expected Discharge Date: 01/13/2024                The patient's care was discussed with the Attending Physician, Dr. Bev Harrison .    Lula Lynn PA-C  Hospitalist Service  [facility]  Securely message with [employer] (more info)  Text page via [employer] Paging/Directory     ______________________________________________________________________    Chief Complaint   Shortness of breath     History is  obtained from the patient    History of Present Illness   Eros Ontiveros is a 89 year old female with past medical history significant for arthritis who was admitted on 1/11/2024 for further evaluation of shortness of breath.     Patient is hypertensive upon arrival to the ED with a BP of 147/100 and intermittently tachypneic with a RR 33 requiring 3-5 LPM. EMS report she had oxygenations in 62% ORA upon arrival. She was placed on a 15L non-rebreather en route. She reports three weeks of progressively worsening SOB and productive cough (green sputum). Associated symptoms include worsening lower extremity edema, decreased appetite, fatigue, altered mental status (confusion and disorientation started on 01/08) and hematuria. Denies recent fevers, chest pain, N/V, abdominal pain, bowel changes or recent syncopal episodes. Last bowel movement 01/10. Family unclear if this bowel movement was normal. Granddaughter notes patient was seen in a virtual visit approximately one week ago for her worsening shortness of breath where she was prescribed a Z-pack. She completed the course of antibiotics without relief of her symptoms. Granddaughter states patient has had an unintentional weight loss of 25 lbs since Thanksgiving. She also states patient has a several year history of exertional shortness of breath and LE edema at her baseline that has never been fully evaluated. Patient is noted to have had minimal PO intake since 01/05. Labs in the ED notable for a BNP 9,857. Trop 146. EKG sinus with PACs. Leukocytosis and thrombocytosis noted with a WBC 11.9 and . Negative lactate. BMP notable for a Na 149, Cl 109, Cr 1.28, BUN 41.5, GFR 40, albumin 3.2 and AST 87. VBGs upon arrival notable for a pH 7.32, pCO2 73, Bicarb 38. COVID, Influenza and RSV negative. CXR demonstrated new dense consolidation at the right upper lobe. Increased consolidation at the left lung base as well that may also represent airspace consolidation.  Small bibasilar pleural fluid. Normal cardiac silhouette. Bilateral shoulder DJD. She was started on ceftriaxone and azithromycin in the ED.     Upon chart review, patient appears to have had concerns of cough and wheezing since at least 04/2023. VBGs at that time demonstrated a pH 7.39, pCO2 52, Bicarb 31. She is also noted to have a history of pedal edema for which she was on 20 mg lasix at one point. No known cardiac history, per daughter.       Past Medical History    Past Medical History:   Diagnosis Date    Arthritis      Past Surgical History   No past surgical history on file.    Prior to Admission Medications   Prior to Admission Medications   Prescriptions Last Dose Informant Patient Reported? Taking?   albuterol (PROAIR HFA) 108 (90 Base) MCG/ACT inhaler Unknown  No Yes   Sig: Inhale 2 puffs into the lungs every 4 hours as needed for shortness of breath   allopurinol (ZYLOPRIM) 300 MG tablet Past Week  Yes Yes   Sig: Take 300 mg by mouth daily   indomethacin (INDOCIN) 25 MG capsule Past Week  Yes Yes   Sig: Take 25 mg by mouth 2 times daily (with meals)      Facility-Administered Medications: None      Social History   I have reviewed this patient's social history and updated it with pertinent information if needed.  Social History     Tobacco Use    Smoking status: Never   Substance Use Topics    Alcohol use: No    Drug use: No     Allergies   Allergies   Allergen Reactions    Penicillins Unknown      Physical Exam   Vital Signs: Temp: 96.8  F (36  C) Temp src: Oral BP: (!) 151/79 Pulse: 55   Resp: 25 SpO2: 97 % O2 Device: BiPAP/CPAP Oxygen Delivery: 3 LPM  Weight: 0 lbs 0 oz    GENERAL:  Patient somnolent, confused and largely unable to follow commands throughout examination due to lethargy. She is arousable. Granddaughter and  at bedside.   HEENT: Normocephalic, atraumatic. Sclera clear. PERRL. Mucous membranes moist. Neck supple with no adenopathy. No JVD.   PULMONOLOGY: Bilateral diffuse  "crackles. Patient tachypneic and placed on BiPAP just prior to auscultation.   CARDIAC: Regular rate and rhythm.  No appreciated murmur.  ABDOMEN: Soft, nontender non distended. Patient denies pain including abdominal pain upon examination.   MUSCULOSKELETAL:  Moving x 4 spontaneously.  2+ pitting edema bilaterally extending to knee. Radial pulses 2+ bilaterally.    NEURO: AxOx2 upon repeat examination. Hard of hearing. States only \"I'm OK\" to most questions.     Medical Decision Making       80 MINUTES SPENT BY ME on the date of service doing chart review, history, exam, documentation & further activities per the note.      Data     I have personally reviewed the following data over the past 24 hrs:    11.9 (H)  \   12.7   / 576 (H)     149 (H) 109 (H) 41.5 (H) /  102 (H)   4.6 33 (H) 1.28 (H) \     ALT: 28 AST: 87 (H) AP: 80 TBILI: 0.4   ALB: 3.2 (L) TOT PROTEIN: 7.7 LIPASE: N/A     Trop: 129 (HH) BNP: 9,857 (H)     Procal: 0.37 CRP: N/A Lactic Acid: 1.0         Imaging results reviewed over the past 24 hrs:   Recent Results (from the past 24 hour(s))   XR Chest Port 1 View    Narrative    CHEST PORTABLE 1 VIEW   2024 10:24 AM     HISTORY: Cough x 3 weeks, dyspnea, hypoxia.    COMPARISON: 2023.      Impression    IMPRESSION: New dense consolidation at the right upper lobe. Correlate  with pneumonia versus other underlying airspace disease. Increased  consolidation at the left lung base as well that may also represent  airspace consolidation. Small bibasilar pleural fluid. Normal cardiac  silhouette. Bilateral shoulder DJD.    PRIYA BARRAZA MD         SYSTEM ID:  [id number]   Echocardiogram Complete   Result Value    LVEF  60-65%    Narrative    [id number]  [id number]  [id number]  [id number]^SILVANA     [facility]  Echocardiography Laboratory  [address] [place] [id number]     Name: LASHON COLLINS  MRN: [id number]  : 06/10/1933  Study Date: 2024 01:18 PM  Age: 89 " yrs  Gender: Female  Patient Location: [facility]  Reason For Study: Heart Failure  Ordering Physician: DEV RAPHAEL  Referring Physician: Damir Collins  Performed By: Kelly Kaur     BSA: 1.8 m2  Height: 60 in  Weight: 180 lb  HR: 64  BP: 135/66 mmHg  ______________________________________________________________________________  Procedure  Complete Portable Echo Adult. Optison (NDC #1619-2642) given intravenously.  ______________________________________________________________________________  Interpretation Summary     The left ventricle is normal in size.  There is severe concentric left ventricular hypertrophy, with evidence for  asymmetric septal hypertrophy. Septal thickness measured up to 2.5 cm. LVOT  obstruction was not identified.  Left ventricular systolic function is normal.  The visual ejection fraction is 60-65%.  Diastolic Doppler findings (E/E' ratio and/or other parameters) suggest left  ventricular filling pressures are increased.  Moderate valvular aortic stenosis. Mean AV gradient 20 mmHg, RODRIGUEZ 1.4 cm2.  There is no comparison study available. Consider infiltrative CM and  hypertrophic CM.  ______________________________________________________________________________  Left Ventricle  The left ventricle is normal in size. There is severe concentric left  ventricular hypertrophy. Asymmetric septal hypertrophy. Septal thickness  measured up to 2.5 cm. LVOT obstruction was not identified. Left ventricular  systolic function is normal. The visual ejection fraction is 60-65%. Diastolic  Doppler findings (E/E' ratio and/or other parameters) suggest left ventricular  filling pressures are increased. Normal left ventricular wall motion.     Right Ventricle  The right ventricle is normal in structure, function and size.     Atria  Normal left atrial size. Right atrial size is normal. There is no atrial shunt  seen.     Mitral Valve  There is moderate mitral annular calcification. The  mitral valve leaflets  appear thickened, but open well. There is trace mitral regurgitation.     Tricuspid Valve  The tricuspid valve is normal in structure and function. There is trace  tricuspid regurgitation. Right ventricular systolic pressure could not be  approximated due to inadequate tricuspid regurgitation. IVC diameter and  respiratory changes fall into an intermediate range suggesting an RA pressure  of 8 mmHg.     Aortic Valve  No aortic regurgitation is present. Moderate valvular aortic stenosis. The  calculated aortic valve are is 1.4 cm^2. The peak AoV pressure gradient is  36.0 mmHg. The mean AoV pressure gradient is 20.0 mmHg.     Pulmonic Valve  The pulmonic valve is not well seen, but is grossly normal. There is trace  pulmonic valvular regurgitation.     Vessels  Normal size aorta.     Pericardium  There is no pericardial effusion.     Rhythm  Sinus rhythm was noted.  ______________________________________________________________________________  MMode/2D Measurements & Calculations  IVSd: 1.3 cm     LVIDd: 4.0 cm  LVIDs: 2.6 cm  LVPWd: 1.2 cm  FS: 35.3 %  LV mass(C)d: 176.2 grams  LV mass(C)dI: 98.7 grams/m2  Ao root diam: 3.3 cm  LA dimension: 4.3 cm  asc Aorta Diam: 3.7 cm  LA/Ao: 1.3  LVOT diam: 2.1 cm  LVOT area: 3.5 cm2  Ao root diam index Ht(cm/m): 2.2  Ao root diam index BSA (cm/m2): 1.8  Asc Ao diam index BSA (cm/m2): 2.1  Asc Ao diam index Ht(cm/m): 2.4  RWT: 0.60  TAPSE: 2.1 cm     Doppler Measurements & Calculations  MV E max reynaldo: 79.1 cm/sec  MV A max reynaldo: 139.0 cm/sec  MV E/A: 0.57  MV dec time: 0.30 sec     Ao V2 max: 300.0 cm/sec  Ao max P.0 mmHg  Ao V2 mean: 205.0 cm/sec  Ao mean P.0 mmHg  Ao V2 VTI: 64.1 cm  RODRIGUEZ(I,D): 1.4 cm2  RODRIGUEZ(V,D): 1.3 cm2  LV V1 max P.0 mmHg  LV V1 max: 112.0 cm/sec  LV V1 VTI: 25.2 cm  SV(LVOT): 87.3 ml  SI(LVOT): 48.9 ml/m2  PA acc time: 0.12 sec  AV Reynaldo Ratio (DI): 0.37  RODRIGUEZ Index (cm2/m2): 0.76  E/E' av.0  Lateral E/e': 18.2  Medial  E/e': 17.7  RV S Reynaldo: 12.2 cm/sec     ______________________________________________________________________________  Report approved by: Araseli Torre 01/11/2024 03:09 PM         CT Chest (PE) Abdomen Pelvis w Contrast    Narrative    CT CHEST PE ABDOMEN PELVIS W CONTRAST 1/11/2024 3:35 PM    CLINICAL HISTORY: Acute hypoxic respiratory failure; CAP; HF  TECHNIQUE: CT angiogram chest and routine CT abdomen pelvis with IV  contrast. Arterial phase through the chest and venous phase through  the abdomen and pelvis. 2D and 3D MIP reconstructions were preformed  by the CT technologist. Dose reduction techniques were used.     CONTRAST: 91mL [id number]-370    COMPARISON: None.    FINDINGS:  ANGIOGRAM CHEST: Pulmonary arteries are normal caliber and negative  for pulmonary emboli. Thoracic aorta is negative for dissection. No CT  evidence of right heart strain.     LUNGS AND PLEURA: Right upper lobe consolidative opacity measuring 4.5  cm (series 3, image 47). Surrounding patchy, groundglass opacity in  the right lung apex and groundglass opacities in the right lower lobe.  Small bilateral pleural effusions, right greater than left. Mild  bronchial wall thickening in the lung bases, likely inflammatory. No  CT evidence for pulmonary edema.    MEDIASTINUM/AXILLAE: No lymphadenopathy. No thoracic aortic aneurysm.  Mild cardiomegaly. No coronary artery calcifications. No pericardial  effusion. Tiny hiatal hernia.    HEPATOBILIARY: No focal lesions of the liver. No calcified gallstones  or biliary ductal dilatation.    PANCREAS: Normal.    SPLEEN: Normal.    ADRENAL GLANDS: Normal.    KIDNEYS/BLADDER: Indeterminate 1.5 cm hyperdense lesion at the lower  pole anterior cortex of the right kidney (series 7, image 84). Tiny  nonobstructing left renal calculus measuring 2 mm. No hydronephrosis  or perinephric stranding bilaterally.    BOWEL: Mild wall thickening and enhancement in the cecum, ascending  colon and transverse colon  may be inflammatory. No small bowel or  colonic obstruction. Sigmoid diverticulosis. Normal appendix.    LYMPH NODES: No lymphadenopathy in the abdomen and pelvis.    PELVIC ORGANS: Indeterminate calcified mass in the endometrium  measuring 2.0 cm. The endometrial lining is expanded to 3.7 cm with  nodular enhancement. Fibroid in the anterior fundus measuring 1.4 cm.    OTHER: No free fluid or fluid collections. No free air.    MUSCULOSKELETAL: Degenerative changes in the bilateral shoulders,  right hip 1and spine. No suspicious lesions in the bones.      Impression    IMPRESSION:  1.  No pulmonary emboli.  2.  Rounded area of consolidation in the right upper lobe measuring  about 4.5 cm could represent pneumonia with a possible underlying  mass. Additional areas of groundglass and patchy opacities in the  right lung, likely due to pneumonia. Small bilateral pleural  effusions. Recommend a follow-up chest CT in 8-12 weeks to ensure  resolution.  3.  Possible mild colitis involving the cecum, ascending colon and  transverse colon, either infectious or inflammatory. Please correlate  clinically.  4.  Finding suspicious for endometrial malignancy with nodular and  calcified enhancing areas in the endometrium and endometrial canal  expanded to 37 mm. Pelvic ultrasound is suggested for further  evaluation.  5.  An indeterminate lesion of the lower pole of the right kidney  measuring 1.4 cm could represent a cyst with proteinaceous or  hemorrhagic material or solid mass. Nonemergent renal ultrasound is  suggested for further evaluation.    LAUREANO YOUNG MD         SYSTEM ID:  [id number]

## 2024-01-11 NOTE — ED PROVIDER NOTES
History     Chief Complaint:  Shortness of Breath     HPI   Eros Ontiveros is a 89 year old female with history of atrial fibrillation who presents to the ED via EMS for three weeks of worsening shortness of breath and cough. Upon EMS arrival today she was satting in the 60s on room air. She was also found to be hypotensive. She was placed on 15L non-rebreather en route. Patient currently has some lower extremity edema as well. No recent fevers or chest pain. Patient is not on O2 at baseline. She is not anticoagulated.     Independent Historian:   None - Patient Only    Review of External Notes:   I reviewed the televisit for her doctor on 1/2/24 and she was prescribed a Z-pack at that time.    Medications:    Pro-air  Indocin   Zyloprim     Past Medical History:    Osteoarthritis, lower leg, localized   SIRS  Morbid obesity   Opioid dependence, daily use  Atrial fibrillation with controlled ventricular rate    Past Surgical History:    Eye surgery  MOHS surgery, right ear     Physical Exam   Patient Vitals for the past 24 hrs:   BP Temp Temp src Pulse Resp SpO2   01/11/24 1030 (!) 153/113 -- -- 65 (!) 33 98 %   01/11/24 1018 -- -- -- 71 -- 96 %   01/11/24 1015 (!) 147/100 96.8  F (36  C) Oral 68 28 97 %   01/11/24 0930 -- -- -- 70 -- 97 %   01/11/24 0907 (!) (P) 147/100 -- -- -- -- --        Physical Exam  Nursing note and vitals reviewed.  HENT:   Mouth/Throat: Moist mucous membranes.   Eyes: EOMI, nonicteric sclera  Cardiovascular: Normal rate, regular rhythm, no murmurs, rubs, or gallops  Pulmonary/Chest: Tachypnea. Bilateral crackles.   Abdominal: Soft. Nontender, nondistended, no guarding or rigidity.   Musculoskeletal: Normal range of motion.  Bilateral lower extremity pitting edema.  Neurological: Alert. Hard of hearing, but responds to questions appropriately. Moves all extremities spontaneously.   Skin: Skin is warm and dry. No rash noted.         Emergency Department Course   ECG  ECG taken at 1121, ECG  read at 1129  Normal sinus rhythm   Normal ECG   Rate 78 bpm. WA interval 172 ms. QRS duration 74 ms. QT/QTc 382/435 ms. P-R-T axes 74 31 56.     Imaging:  XR Chest Port 1 View   Preliminary Result   IMPRESSION: New dense consolidation at the right upper lobe. Correlate   with pneumonia versus other underlying airspace disease. Increased   consolidation at the left lung base as well that may also represent   airspace consolidation. Small bibasilar pleural fluid. Normal cardiac   silhouette. Bilateral shoulder DJD.      Echocardiogram Complete    (Results Pending)          Laboratory:  Labs Ordered and Resulted from Time of ED Arrival to Time of ED Departure   COMPREHENSIVE METABOLIC PANEL - Abnormal       Result Value    Sodium 149 (*)     Potassium 4.6      Carbon Dioxide (CO2) 33 (*)     Anion Gap 7      Urea Nitrogen 41.5 (*)     Creatinine 1.28 (*)     GFR Estimate 40 (*)     Calcium 9.0      Chloride 109 (*)     Glucose 102 (*)     Alkaline Phosphatase 80      AST 87 (*)     ALT 28      Protein Total 7.7      Albumin 3.2 (*)     Bilirubin Total 0.4     TROPONIN T, HIGH SENSITIVITY - Abnormal    Troponin T, High Sensitivity 146 (*)    NT PROBNP INPATIENT - Abnormal    N terminal Pro BNP Inpatient 9,857 (*)    ISTAT GASES LACTATE VENOUS POCT - Abnormal    Lactic Acid POCT 1.4      Bicarbonate Venous POCT 38 (*)     O2 Sat, Venous POCT 31 (*)     pCO2 Venous POCT 73 (*)     pH Venous POCT 7.32      pO2 Venous POCT 22 (*)    CBC WITH PLATELETS AND DIFFERENTIAL - Abnormal    WBC Count 11.9 (*)     RBC Count 4.08      Hemoglobin 12.7      Hematocrit 43.6       (*)     MCH 31.1      MCHC 29.1 (*)     RDW 16.9 (*)     Platelet Count 576 (*)     % Neutrophils 83      % Lymphocytes 6      % Monocytes 7      % Eosinophils 0      % Basophils 0      % Immature Granulocytes 4      NRBCs per 100 WBC 2 (*)     Absolute Neutrophils 9.8 (*)     Absolute Lymphocytes 0.7 (*)     Absolute Monocytes 0.9      Absolute  Eosinophils 0.0      Absolute Basophils 0.0      Absolute Immature Granulocytes 0.5 (*)     Absolute NRBCs 0.3     ISTAT GASES LACTATE VENOUS POCT - Abnormal    Lactic Acid POCT 1.0      Bicarbonate Venous POCT 35 (*)     O2 Sat, Venous POCT 62 (*)     pCO2 Venous POCT 77 (*)     pH Venous POCT 7.26 (*)     pO2 Venous POCT 39     INFLUENZA A/B, RSV, & SARS-COV2 PCR - Normal    Influenza A PCR Negative      Influenza B PCR Negative      RSV PCR Negative      SARS CoV2 PCR Negative     BLOOD GAS ARTERIAL WITH OXYHEMOGLOBIN   GLUCOSE MONITOR NURSING POCT   BLOOD CULTURE   BLOOD CULTURE        Emergency Department Course & Assessments:       Interventions:  Medications   azithromycin (ZITHROMAX) 500 mg vial to attach to  mL bag (500 mg Intravenous $New Bag 1/11/24 1125)   cefTRIAXone (ROCEPHIN) 1 g vial to attach to  mL bag for ADULTS or NS 50 mL bag for PEDS (0 g Intravenous Stopped 1/11/24 1124)        Assessments:  0912 I obtained history and examined the patient as noted above.  1005 I rechecked the patient and explained findings to patient and her family.     Independent Interpretation (X-rays, CTs, rhythm strip):  I independently reviewed the Chest X-ray. I see evidence of infiltrates in the right upper lobe, LLL, and bilateral pleural fluid.     Consultations/Discussion of Management or Tests:  1129 Discussed case with Lesia Lynn PA-C accepting for Dr. Harrison, hospitalist.        Social Determinants of Health affecting care:   None    Disposition:  The patient was admitted to the hospital under the care of Dr. Harrison.     Impression & Plan      Medical Decision Making:  Patient presents with chief complaint shortness of breath.  Found to be significantly hypoxic by EMS.  On exam, she has bilateral crackles.  She is mentating appropriately, though quite hard of hearing.  Chest x-ray suggests multifocal consolidations concerning for pneumonia.  Patient started on Rocephin and azithromycin for  community-acquired pneumonia coverage.  Initial blood gas showed hypercapnia with a normal pH.  Subsequent blood gas showing worsening acidosis prompting BiPAP initiation.  No lactic acidosis noted.  Labs suggestive of dehydration, but simultaneously of fluid overload.  Clinically, presentation is more consistent with fluid overload, therefore I held off on IV fluid administration.  I discussed the diagnosis/treatment with patient and her family and answered all their questions.  Case discussed with hospitalist service, BARBRA Lynn, who accepts patient for admission.      Diagnosis:    ICD-10-CM    1. Community acquired pneumonia, bilateral  J18.9       2. Acute respiratory failure with hypoxia and hypercapnia (H)  J96.01     J96.02            Scribe Disclosure:  I, Joan Thacker, am serving as a scribe at 9:12 AM on 1/11/2024 to document services personally performed by Bneji Magallon MD based on my observations and the provider's statements to me.          Benji Magallon MD  01/11/24 0424

## 2024-01-11 NOTE — PHARMACY-ADMISSION MEDICATION HISTORY
Pharmacist Admission Medication History    Admission medication history is complete. The information provided in this note is only as accurate as the sources available at the time of the update.    Information Source(s): Family member, [employer] via in-person    Pertinent Information:     Changes made to PTA medication list:  Added: allopurinol   Deleted: Tylenol #3, cefuroxime  Changed: indomethacin (from 4x daily PRN to BID w/meals)     Medication Affordability:  Not including over the counter (OTC) medications, was there a time in the past 3 months when you did not take your medications as prescribed because of cost?: No    Allergies reviewed with patient and updates made in EHR: unable to assess    Medication History Completed By: JOHNY PULIDO [facility] 1/11/2024 9:34 AM    Prior to Admission medications    Medication Sig Last Dose Taking? Auth Provider Long Term End Date   albuterol (PROAIR HFA) 108 (90 Base) MCG/ACT inhaler Inhale 2 puffs into the lungs every 4 hours as needed for shortness of breath Unknown Yes Damir Rizvi MD Yes    allopurinol (ZYLOPRIM) 300 MG tablet Take 300 mg by mouth daily Past Week Yes Unknown, Entered By History     indomethacin (INDOCIN) 25 MG capsule Take 25 mg by mouth 2 times daily (with meals) Past Week Yes Unknown, Entered By History

## 2024-01-11 NOTE — CONSULTS
[facility]    Cardiology Consultation     Date of Admission:  1/11/2024    Assessment & Plan   Eros Ontiveros is a 89 year old female who was admitted on 1/11/2024.    Patient has a past medical history of gout. Patient has no cardiac history    1.  Severe concentric left ventricular hypertrophy with evidence of asymmetric septal hypertrophy  -Seen on TTE, new finding, possible etiologies HTN, HCM, or amyloidosis    2.  Moderate aortic stenosis  -TTE showing mean gradient 20 mmHg, RODRIGUEZ 1.2 cm     3. BRANDT  -Creatinine 1.28 on admission    Plan:   Repeat echo with strain imaging  Consider cMRI to assess for hypertrophic cardiomyopathy or amyloidosis considering echo findings of severe concentric LVH and septal hypertrophy  Monitor for response to diuresis  Will likely need further discussion regarding goals o care considering concerns for possible lung and endometrial mass  Monitor renal function closely with diuresis considering baseline BRANDT    Moderate complexity     Cece Cabrera NP    Primary Care Physician   Damir Collins    Reason for Consult   Reason for consult: I was asked by the hospitalist to evaluate this patient for abnormal echo.    History of Present Illness   Eros Ontiveros is a 89 year old female who presents with shortness of breath and cough x 3 weeks.     Patient contacted EMS following 3 weeks of worsening shortness of breath and cough.  On EMS arrival she was saturating in the 60s on room air and found to be hypotensive.  She also had some lower extremity edema.  EKG showed normal sinus rhythm with a rate of 78.  Chest x-ray showed a new dense consolidation of the right upper lobe, pneumonia versus underlying airspace disease and consolidation in the left lung base could also represent airspace consolidation with with small bibasilar pleural fluid.  NT proBNP 9857, troponin 146, BMP with elevated creatinine at 1.28 and GFR 40, white blood cell count 11.9,  hemoglobin 12.7, platelets 576.      Chest CT with findings consistent with right upper lobe consolidation concerning for pneumonia vs mass, no pulmonary edema and finding concerning for endometrial malignancy.    She has been initiated on antibiotics as well as given an IV dose of furosemide 20 mg x 1. Since then she has continued to require bipap to maintain saturations ad remains drowsy but arousable.     According to patient's granddaughter who is POA, patient has been unwell for some time. Approximately 2 months of cough and long standing lower extremity edema. Patient has no known cardiac history and takes no cardiac medications at home.       Past Medical History   Past Medical History:   Diagnosis Date    Arthritis        Past Surgical History   No past surgical history on file.      Prior to Admission Medications   Prior to Admission Medications   Prescriptions Last Dose Informant Patient Reported? Taking?   albuterol (PROAIR HFA) 108 (90 Base) MCG/ACT inhaler Unknown  No Yes   Sig: Inhale 2 puffs into the lungs every 4 hours as needed for shortness of breath   allopurinol (ZYLOPRIM) 300 MG tablet Past Week  Yes Yes   Sig: Take 300 mg by mouth daily   indomethacin (INDOCIN) 25 MG capsule Past Week  Yes Yes   Sig: Take 25 mg by mouth 2 times daily (with meals)      Facility-Administered Medications: None     Current Facility-Administered Medications   Medication Dose Route Frequency    furosemide  20 mg Intravenous Once     Current Facility-Administered Medications   Medication Last Rate     Allergies   Allergies   Allergen Reactions    Penicillins Unknown       Social History    reports that she has never smoked. She does not have any smokeless tobacco history on file. She reports that she does not drink alcohol and does not use drugs.      Family History          Review of Systems   A comprehensive review of system was performed and is negative other than that noted in the HPI or here.     Physical Exam  "  Vital Signs with Ranges  Temp:  [96.8  F (36  C)] 96.8  F (36  C)  Pulse:  [53-75] 53  Resp:  [11-33] 11  BP: (126-155)/() 145/77  FiO2 (%):  [30 %-40 %] 40 %  SpO2:  [91 %-98 %] 95 %  Wt Readings from Last 4 Encounters:   08/27/12 81.8 kg (180 lb 4.7 oz)     No intake/output data recorded.      Vitals: BP (!) 145/77   Pulse 53   Temp 96.8  F (36  C) (Oral)   Resp 11   SpO2 95%     Physical Exam:   General - drowsy, oriented to time place and person  Eyes - No scleral icterus  HEENT - Neck supple, moist mucous membranes  Cardiovascular - Regular rate and rhythm, III/VI systolic murmur  Extremities - There is 3+ pitting bilateral LE edema  Respiratory - Rhonchi bilaterally, on bipap  Skin - No pallor or cyanosis  Gastrointestinal - Non tender and non distended without rebound or guarding  Psych - Appropriate affect   Neurological - No gross motor neurological focal deficits    No lab results found in last 7 days.    Invalid input(s): \"TROPONINIES\"    Recent Labs   Lab 01/11/24  1010   WBC 11.9*   HGB 12.7   *   *   *   POTASSIUM 4.6   CHLORIDE 109*   CO2 33*   BUN 41.5*   CR 1.28*   GFRESTIMATED 40*   ANIONGAP 7   ZAK 9.0   *   ALBUMIN 3.2*   PROTTOTAL 7.7   BILITOTAL 0.4   ALKPHOS 80   ALT 28   AST 87*     No results for input(s): \"CHOL\", \"HDL\", \"LDL\", \"TRIG\", \"CHOLHDLRATIO\" in the last 30159 hours.  Recent Labs   Lab 01/11/24  1010   WBC 11.9*   HGB 12.7   HCT 43.6   *   *     Recent Labs   Lab 01/11/24  1310 01/11/24  1137 01/11/24  0952   PHV 7.25* 7.26* 7.32   PO2V 25 39 22*   PCO2V 80* 77* 73*   HCO3V 35* 35* 38*     Recent Labs   Lab 01/11/24  1010   NTBNPI 9,857*     No results for input(s): \"DD\" in the last 168 hours.  No results for input(s): \"SED\", \"CRP\" in the last 168 hours.  Recent Labs   Lab 01/11/24  1010   *     No results for input(s): \"TSH\" in the last 168 hours.  No results for input(s): \"COLOR\", \"APPEARANCE\", \"URINEGLC\", \"URINEBILI\", " "\"URINEKETONE\", \"SG\", \"UBLD\", \"URINEPH\", \"PROTEIN\", \"UROBILINOGEN\", \"NITRITE\", \"LEUKEST\", \"RBCU\", \"WBCU\" in the last 168 hours.    Imaging:  Recent Results (from the past 48 hour(s))   XR Chest Port 1 View    Narrative    CHEST PORTABLE 1 VIEW   2024 10:24 AM     HISTORY: Cough x 3 weeks, dyspnea, hypoxia.    COMPARISON: 2023.      Impression    IMPRESSION: New dense consolidation at the right upper lobe. Correlate  with pneumonia versus other underlying airspace disease. Increased  consolidation at the left lung base as well that may also represent  airspace consolidation. Small bibasilar pleural fluid. Normal cardiac  silhouette. Bilateral shoulder DJD.    PRIYA BARRAZA MD         SYSTEM ID:  [id number]   Echocardiogram Complete   Result Value    LVEF  60-65%    Narrative    [id number]  [id number]  [id number]  [id number]^IJEOMA^DEV^SILVANA     [facility]  Echocardiography Laboratory  [address] [place] [address]     Name: LASHON COLLINS  MRN: [id number]  : 06/10/1933  Study Date: 2024 01:18 PM  Age: 89 yrs  Gender: Female  Patient Location: [facility]  Reason For Study: Heart Failure  Ordering Physician: DEV RAPHAEL  Referring Physician: Damir Collins  Performed By: Kelly Kaur     BSA: 1.8 m2  Height: 60 in  Weight: 180 lb  HR: 64  BP: 135/66 mmHg  ______________________________________________________________________________  Procedure  Complete Portable Echo Adult. Optison (NDC #[id number]) given intravenously.  ______________________________________________________________________________  Interpretation Summary     The left ventricle is normal in size.  There is severe concentric left ventricular hypertrophy, with evidence for  asymmetric septal hypertrophy. Septal thickness measured up to 2.5 cm. LVOT  obstruction was not identified.  Left ventricular systolic function is normal.  The visual ejection fraction is 60-65%.  Diastolic Doppler findings " (E/E' ratio and/or other parameters) suggest left  ventricular filling pressures are increased.  Moderate valvular aortic stenosis. Mean AV gradient 20 mmHg, RODRIGUEZ 1.4 cm2.  There is no comparison study available. Consider infiltrative CM and  hypertrophic CM.  ______________________________________________________________________________  Left Ventricle  The left ventricle is normal in size. There is severe concentric left  ventricular hypertrophy. Asymmetric septal hypertrophy. Septal thickness  measured up to 2.5 cm. LVOT obstruction was not identified. Left ventricular  systolic function is normal. The visual ejection fraction is 60-65%. Diastolic  Doppler findings (E/E' ratio and/or other parameters) suggest left ventricular  filling pressures are increased. Normal left ventricular wall motion.     Right Ventricle  The right ventricle is normal in structure, function and size.     Atria  Normal left atrial size. Right atrial size is normal. There is no atrial shunt  seen.     Mitral Valve  There is moderate mitral annular calcification. The mitral valve leaflets  appear thickened, but open well. There is trace mitral regurgitation.     Tricuspid Valve  The tricuspid valve is normal in structure and function. There is trace  tricuspid regurgitation. Right ventricular systolic pressure could not be  approximated due to inadequate tricuspid regurgitation. IVC diameter and  respiratory changes fall into an intermediate range suggesting an RA pressure  of 8 mmHg.     Aortic Valve  No aortic regurgitation is present. Moderate valvular aortic stenosis. The  calculated aortic valve are is 1.4 cm^2. The peak AoV pressure gradient is  36.0 mmHg. The mean AoV pressure gradient is 20.0 mmHg.     Pulmonic Valve  The pulmonic valve is not well seen, but is grossly normal. There is trace  pulmonic valvular regurgitation.     Vessels  Normal size aorta.     Pericardium  There is no pericardial effusion.     Rhythm  Sinus rhythm  was noted.  ______________________________________________________________________________  MMode/2D Measurements & Calculations  IVSd: 1.3 cm     LVIDd: 4.0 cm  LVIDs: 2.6 cm  LVPWd: 1.2 cm  FS: 35.3 %  LV mass(C)d: 176.2 grams  LV mass(C)dI: 98.7 grams/m2  Ao root diam: 3.3 cm  LA dimension: 4.3 cm  asc Aorta Diam: 3.7 cm  LA/Ao: 1.3  LVOT diam: 2.1 cm  LVOT area: 3.5 cm2  Ao root diam index Ht(cm/m): 2.2  Ao root diam index BSA (cm/m2): 1.8  Asc Ao diam index BSA (cm/m2): 2.1  Asc Ao diam index Ht(cm/m): 2.4  RWT: 0.60  TAPSE: 2.1 cm     Doppler Measurements & Calculations  MV E max reynaldo: 79.1 cm/sec  MV A max reynaldo: 139.0 cm/sec  MV E/A: 0.57  MV dec time: 0.30 sec     Ao V2 max: 300.0 cm/sec  Ao max P.0 mmHg  Ao V2 mean: 205.0 cm/sec  Ao mean P.0 mmHg  Ao V2 VTI: 64.1 cm  RODRIGUEZ(I,D): 1.4 cm2  RODRIGUEZ(V,D): 1.3 cm2  LV V1 max P.0 mmHg  LV V1 max: 112.0 cm/sec  LV V1 VTI: 25.2 cm  SV(LVOT): 87.3 ml  SI(LVOT): 48.9 ml/m2  PA acc time: 0.12 sec  AV Reynaldo Ratio (DI): 0.37  RODRIGUEZ Index (cm2/m2): 0.76  E/E' av.0  Lateral E/e': 18.2  Medial E/e': 17.7  RV S Reynaldo: 12.2 cm/sec     ______________________________________________________________________________  Report approved by: Araseli Torre 2024 03:09 PM         CT Chest (PE) Abdomen Pelvis w Contrast    Narrative    CT CHEST PE ABDOMEN PELVIS W CONTRAST 2024 3:35 PM    CLINICAL HISTORY: Acute hypoxic respiratory failure; CAP; HF  TECHNIQUE: CT angiogram chest and routine CT abdomen pelvis with IV  contrast. Arterial phase through the chest and venous phase through  the abdomen and pelvis. 2D and 3D MIP reconstructions were preformed  by the CT technologist. Dose reduction techniques were used.     CONTRAST: 91mL Isouve-370    COMPARISON: None.    FINDINGS:  ANGIOGRAM CHEST: Pulmonary arteries are normal caliber and negative  for pulmonary emboli. Thoracic aorta is negative for dissection. No CT  evidence of right heart strain.     LUNGS AND PLEURA:  Right upper lobe consolidative opacity measuring 4.5  cm (series 3, image 47). Surrounding patchy, groundglass opacity in  the right lung apex and groundglass opacities in the right lower lobe.  Small bilateral pleural effusions, right greater than left. Mild  bronchial wall thickening in the lung bases, likely inflammatory. No  CT evidence for pulmonary edema.    MEDIASTINUM/AXILLAE: No lymphadenopathy. No thoracic aortic aneurysm.  Mild cardiomegaly. No coronary artery calcifications. No pericardial  effusion. Tiny hiatal hernia.    HEPATOBILIARY: No focal lesions of the liver. No calcified gallstones  or biliary ductal dilatation.    PANCREAS: Normal.    SPLEEN: Normal.    ADRENAL GLANDS: Normal.    KIDNEYS/BLADDER: Indeterminate 1.5 cm hyperdense lesion at the lower  pole anterior cortex of the right kidney (series 7, image 84). Tiny  nonobstructing left renal calculus measuring 2 mm. No hydronephrosis  or perinephric stranding bilaterally.    BOWEL: Mild wall thickening and enhancement in the cecum, ascending  colon and transverse colon may be inflammatory. No small bowel or  colonic obstruction. Sigmoid diverticulosis. Normal appendix.    LYMPH NODES: No lymphadenopathy in the abdomen and pelvis.    PELVIC ORGANS: Indeterminate calcified mass in the endometrium  measuring 2.0 cm. The endometrial lining is expanded to 3.7 cm with  nodular enhancement. Fibroid in the anterior fundus measuring 1.4 cm.    OTHER: No free fluid or fluid collections. No free air.    MUSCULOSKELETAL: Degenerative changes in the bilateral shoulders,  right hip 1and spine. No suspicious lesions in the bones.      Impression    IMPRESSION:  1.  No pulmonary emboli.  2.  Rounded area of consolidation in the right upper lobe measuring  about 4.5 cm could represent pneumonia with a possible underlying  mass. Additional areas of groundglass and patchy opacities in the  right lung, likely due to pneumonia. Small bilateral pleural  effusions.  Recommend a follow-up chest CT in 8-12 weeks to ensure  resolution.  3.  Possible mild colitis involving the cecum, ascending colon and  transverse colon, either infectious or inflammatory. Please correlate  clinically.  4.  Finding suspicious for endometrial malignancy with nodular and  calcified enhancing areas in the endometrium and endometrial canal  expanded to 37 mm. Pelvic ultrasound is suggested for further  evaluation.  5.  An indeterminate lesion of the lower pole of the right kidney  measuring 1.4 cm could represent a cyst with proteinaceous or  hemorrhagic material or solid mass. Nonemergent renal ultrasound is  suggested for further evaluation.    LAUREANO YOUNG MD         SYSTEM ID:  [id number]       Echo:  No results found for this or any previous visit (from the past 4320 hour(s)).    Clinically Significant Risk Factors Present on Admission         # Hypernatremia: Highest Na = 149 mmol/L in last 2 days, will monitor as appropriate      # Hypoalbuminemia: Lowest albumin = 3.2 g/dL at 1/11/2024 10:10 AM, will monitor as appropriate       # Non-Invasive mechanical ventilation: current O2 Device: BiPAP/CPAP  # Acute hypoxic respiratory failure: continue supplemental O2 as needed               Non-Rheumatic Valve Disease: Aortic valve stenosis      hypernatremia and Hyperosmolality       Acute kidney failure, unspecified      Bacterial Pneumonia    Chronic Fatigue and Other Debilities: Other reduced mobility

## 2024-01-11 NOTE — ED TRIAGE NOTES
Patient comes in via EMS with c/o of cough and SOB for the past 3 weeks. Last night the SOB had progressively gotten worse. Pt was hypoxic at 60% for EMS. She was placed on NRB. Currently she is on 5L oxymask.     Triage Assessment (Adult)       Row Name 01/11/24 1019          Triage Assessment    Airway WDL WDL        Respiratory WDL    Respiratory WDL X;all     Rhythm/Pattern, Respiratory dyspnea upon exertion;shortness of breath     Expansion/Accessory Muscles/Retractions accessory muscle use     Cough Frequency infrequent        Cardiac WDL    Cardiac WDL WDL        Cognitive/Neuro/Behavioral WDL    Cognitive/Neuro/Behavioral WDL X;arousability     Level of Consciousness somnolent     Arousal Level arouses to voice     Orientation disoriented to;time     Mood/Behavior withdrawn        [place] Coma Scale    Best Eye Response 4-->(E4) spontaneous     Best Motor Response 6-->(M6) obeys commands     Best Verbal Response 4-->(V4) confused     Dominik Coma Scale Score 14

## 2024-01-11 NOTE — ED NOTES
Neuro: A/Ox3- pt is forgetful to time.     CV: tele SB at times, other wise NSR with occasional PVC's. Map has been >65.     Resp: pt put on bipap today for high Co2 levels. Currently at 40% FiO2.     : Low UOP until lasix given, after pt had much better urine return. Purewick in place for voiding needs.    Skin: generalized +2 edema, scattered bruising.     Activity:     Plan: Waiting on C bed.

## 2024-01-11 NOTE — ED NOTES
Bed: ED27  Expected date:   Expected time:   Means of arrival:   Comments:  Ike 523 89 F cough for 3 weeks was at 62% RA

## 2024-01-12 ENCOUNTER — APPOINTMENT (OUTPATIENT)
Dept: CARDIOLOGY | Facility: CLINIC | Age: 89
DRG: 193 | End: 2024-01-12
Attending: NURSE PRACTITIONER
Payer: COMMERCIAL

## 2024-01-12 ENCOUNTER — APPOINTMENT (OUTPATIENT)
Dept: SPEECH THERAPY | Facility: CLINIC | Age: 89
DRG: 193 | End: 2024-01-12
Attending: HOSPITALIST
Payer: COMMERCIAL

## 2024-01-12 LAB
ALBUMIN SERPL BCG-MCNC: 2.9 G/DL (ref 3.5–5.2)
ALP SERPL-CCNC: 70 U/L (ref 40–150)
ALT SERPL W P-5'-P-CCNC: 24 U/L (ref 0–50)
ANION GAP SERPL CALCULATED.3IONS-SCNC: 7 MMOL/L (ref 7–15)
AST SERPL W P-5'-P-CCNC: 71 U/L (ref 0–45)
ATRIAL RATE - MUSE: 43 BPM
BASE EXCESS BLDV CALC-SCNC: 10.8 MMOL/L (ref -7.7–1.9)
BASE EXCESS BLDV CALC-SCNC: 10.8 MMOL/L (ref -7.7–1.9)
BASE EXCESS BLDV CALC-SCNC: 9.4 MMOL/L (ref -7.7–1.9)
BILIRUB SERPL-MCNC: 0.4 MG/DL
BUN SERPL-MCNC: 34.3 MG/DL (ref 8–23)
CALCIUM SERPL-MCNC: 8.9 MG/DL (ref 8.2–9.6)
CHLORIDE SERPL-SCNC: 108 MMOL/L (ref 98–107)
CK SERPL-CCNC: 42 U/L (ref 26–192)
CREAT SERPL-MCNC: 1.16 MG/DL (ref 0.51–0.95)
DEPRECATED HCO3 PLAS-SCNC: 36 MMOL/L (ref 22–29)
DIASTOLIC BLOOD PRESSURE - MUSE: NORMAL MMHG
EGFRCR SERPLBLD CKD-EPI 2021: 45 ML/MIN/1.73M2
ERYTHROCYTE [DISTWIDTH] IN BLOOD BY AUTOMATED COUNT: 17.1 % (ref 10–15)
GLUCOSE BLDC GLUCOMTR-MCNC: 110 MG/DL (ref 70–99)
GLUCOSE BLDC GLUCOMTR-MCNC: 126 MG/DL (ref 70–99)
GLUCOSE BLDC GLUCOMTR-MCNC: 57 MG/DL (ref 70–99)
GLUCOSE BLDC GLUCOMTR-MCNC: 76 MG/DL (ref 70–99)
GLUCOSE BLDC GLUCOMTR-MCNC: 81 MG/DL (ref 70–99)
GLUCOSE BLDC GLUCOMTR-MCNC: 86 MG/DL (ref 70–99)
GLUCOSE BLDC GLUCOMTR-MCNC: 89 MG/DL (ref 70–99)
GLUCOSE BLDC GLUCOMTR-MCNC: 99 MG/DL (ref 70–99)
GLUCOSE SERPL-MCNC: 78 MG/DL (ref 70–99)
HCO3 BLDV-SCNC: 39 MMOL/L (ref 21–28)
HCT VFR BLD AUTO: 44.8 % (ref 35–47)
HGB BLD-MCNC: 13.5 G/DL (ref 11.7–15.7)
INTERPRETATION ECG - MUSE: NORMAL
LVEF ECHO: NORMAL
MCH RBC QN AUTO: 31.8 PG (ref 26.5–33)
MCHC RBC AUTO-ENTMCNC: 30.1 G/DL (ref 31.5–36.5)
MCV RBC AUTO: 106 FL (ref 78–100)
O2/TOTAL GAS SETTING VFR VENT: 3 %
OXYHGB MFR BLDV: 37 % (ref 70–75)
P AXIS - MUSE: 73 DEGREES
PCO2 BLDV: 66 MM HG (ref 40–50)
PH BLDV: 7.37 [PH] (ref 7.32–7.43)
PH BLDV: 7.38 [PH] (ref 7.32–7.43)
PH BLDV: 7.38 [PH] (ref 7.32–7.43)
PLATELET # BLD AUTO: 430 10E3/UL (ref 150–450)
PO2 BLDV: 24 MM HG (ref 25–47)
PO2 BLDV: 24 MM HG (ref 25–47)
PO2 BLDV: 35 MM HG (ref 25–47)
POTASSIUM SERPL-SCNC: 4.4 MMOL/L (ref 3.4–5.3)
PR INTERVAL - MUSE: 136 MS
PROT SERPL-MCNC: 7.1 G/DL (ref 6.4–8.3)
QRS DURATION - MUSE: 98 MS
QT - MUSE: 538 MS
QTC - MUSE: 454 MS
R AXIS - MUSE: -41 DEGREES
RBC # BLD AUTO: 4.24 10E6/UL (ref 3.8–5.2)
SODIUM SERPL-SCNC: 151 MMOL/L (ref 135–145)
SYSTOLIC BLOOD PRESSURE - MUSE: NORMAL MMHG
T AXIS - MUSE: 107 DEGREES
VENTRICULAR RATE- MUSE: 43 BPM
WBC # BLD AUTO: 11.8 10E3/UL (ref 4–11)

## 2024-01-12 PROCEDURE — 250N000013 HC RX MED GY IP 250 OP 250 PS 637: Performed by: HOSPITALIST

## 2024-01-12 PROCEDURE — 999N000157 HC STATISTIC RCP TIME EA 10 MIN

## 2024-01-12 PROCEDURE — 94640 AIRWAY INHALATION TREATMENT: CPT | Mod: 76

## 2024-01-12 PROCEDURE — 93321 DOPPLER ECHO F-UP/LMTD STD: CPT | Mod: 26 | Performed by: INTERNAL MEDICINE

## 2024-01-12 PROCEDURE — 82550 ASSAY OF CK (CPK): CPT | Performed by: HOSPITALIST

## 2024-01-12 PROCEDURE — 94640 AIRWAY INHALATION TREATMENT: CPT

## 2024-01-12 PROCEDURE — 94660 CPAP INITIATION&MGMT: CPT

## 2024-01-12 PROCEDURE — 82805 BLOOD GASES W/O2 SATURATION: CPT | Performed by: HOSPITALIST

## 2024-01-12 PROCEDURE — 85027 COMPLETE CBC AUTOMATED: CPT

## 2024-01-12 PROCEDURE — 93325 DOPPLER ECHO COLOR FLOW MAPG: CPT

## 2024-01-12 PROCEDURE — 99233 SBSQ HOSP IP/OBS HIGH 50: CPT | Performed by: HOSPITALIST

## 2024-01-12 PROCEDURE — 80053 COMPREHEN METABOLIC PANEL: CPT

## 2024-01-12 PROCEDURE — 93325 DOPPLER ECHO COLOR FLOW MAPG: CPT | Mod: 26 | Performed by: INTERNAL MEDICINE

## 2024-01-12 PROCEDURE — 250N000013 HC RX MED GY IP 250 OP 250 PS 637: Performed by: INTERNAL MEDICINE

## 2024-01-12 PROCEDURE — 99232 SBSQ HOSP IP/OBS MODERATE 35: CPT | Performed by: INTERNAL MEDICINE

## 2024-01-12 PROCEDURE — 250N000009 HC RX 250

## 2024-01-12 PROCEDURE — 93308 TTE F-UP OR LMTD: CPT | Mod: 26 | Performed by: INTERNAL MEDICINE

## 2024-01-12 PROCEDURE — 93010 ELECTROCARDIOGRAM REPORT: CPT | Performed by: INTERNAL MEDICINE

## 2024-01-12 PROCEDURE — 250N000011 HC RX IP 250 OP 636

## 2024-01-12 PROCEDURE — 36415 COLL VENOUS BLD VENIPUNCTURE: CPT

## 2024-01-12 PROCEDURE — 82803 BLOOD GASES ANY COMBINATION: CPT

## 2024-01-12 PROCEDURE — 258N000003 HC RX IP 258 OP 636

## 2024-01-12 PROCEDURE — 92610 EVALUATE SWALLOWING FUNCTION: CPT | Mod: GN

## 2024-01-12 PROCEDURE — 120N000013 HC R&B IMCU

## 2024-01-12 PROCEDURE — 93005 ELECTROCARDIOGRAM TRACING: CPT

## 2024-01-12 RX ORDER — AMOXICILLIN 250 MG
1 CAPSULE ORAL 2 TIMES DAILY
Status: DISCONTINUED | OUTPATIENT
Start: 2024-01-12 | End: 2024-01-22 | Stop reason: HOSPADM

## 2024-01-12 RX ORDER — TORSEMIDE 10 MG/1
10 TABLET ORAL
Status: DISCONTINUED | OUTPATIENT
Start: 2024-01-12 | End: 2024-01-13

## 2024-01-12 RX ADMIN — IPRATROPIUM BROMIDE AND ALBUTEROL SULFATE 3 ML: .5; 3 SOLUTION RESPIRATORY (INHALATION) at 08:31

## 2024-01-12 RX ADMIN — IPRATROPIUM BROMIDE AND ALBUTEROL SULFATE 3 ML: .5; 3 SOLUTION RESPIRATORY (INHALATION) at 19:47

## 2024-01-12 RX ADMIN — IPRATROPIUM BROMIDE AND ALBUTEROL SULFATE 3 ML: .5; 3 SOLUTION RESPIRATORY (INHALATION) at 13:27

## 2024-01-12 RX ADMIN — TORSEMIDE 10 MG: 10 TABLET ORAL at 18:32

## 2024-01-12 RX ADMIN — AZITHROMYCIN MONOHYDRATE 250 MG: 500 INJECTION, POWDER, LYOPHILIZED, FOR SOLUTION INTRAVENOUS at 12:26

## 2024-01-12 RX ADMIN — SENNOSIDES AND DOCUSATE SODIUM 1 TABLET: 50; 8.6 TABLET ORAL at 13:01

## 2024-01-12 RX ADMIN — TORSEMIDE 10 MG: 10 TABLET ORAL at 11:16

## 2024-01-12 RX ADMIN — CEFTRIAXONE SODIUM 1 G: 1 INJECTION, POWDER, FOR SOLUTION INTRAMUSCULAR; INTRAVENOUS at 11:25

## 2024-01-12 RX ADMIN — MICONAZOLE NITRATE: 2 POWDER TOPICAL at 20:17

## 2024-01-12 RX ADMIN — MICONAZOLE NITRATE: 2 POWDER TOPICAL at 10:06

## 2024-01-12 RX ADMIN — IPRATROPIUM BROMIDE AND ALBUTEROL SULFATE 3 ML: .5; 3 SOLUTION RESPIRATORY (INHALATION) at 16:49

## 2024-01-12 RX ADMIN — SENNOSIDES AND DOCUSATE SODIUM 1 TABLET: 50; 8.6 TABLET ORAL at 20:16

## 2024-01-12 ASSESSMENT — ACTIVITIES OF DAILY LIVING (ADL)
ADLS_ACUITY_SCORE: 55
ADLS_ACUITY_SCORE: 55
ADLS_ACUITY_SCORE: 35
ADLS_ACUITY_SCORE: 55
ADLS_ACUITY_SCORE: 53
ADLS_ACUITY_SCORE: 49
ADLS_ACUITY_SCORE: 55
ADLS_ACUITY_SCORE: 53
ADLS_ACUITY_SCORE: 53
ADLS_ACUITY_SCORE: 35
ADLS_ACUITY_SCORE: 35
ADLS_ACUITY_SCORE: 53

## 2024-01-12 NOTE — CONSULTS
[facility]    Received [facility] Consult from MIMI Cardoza.     Reviewed Eros's chart and note they are admitted for respiratory failure secondary to community acquired pneumonia and HFpEF. Echocardiogram shows LVEF 60-65%. This is their first admission in the past year for concerns of heart failure.     Given above information and LVEF >/= 40% , Eros meets criteria for general Cardiology follow up vs PCP Follow up. Defer to inpatient Cardiology team.     Bedside RN to do CHF education.     Future Appointments   Date Time Provider Department Center   1/12/2024  2:15 PM Luís Conner, OTR AXEL [facility]   1/12/2024  7:00 PM So Hanson, PT SHPT [facility]     Please call with questions.      Fe Freitas RN BSN  [facility] RN Care Coordinator   [facility]   [phone]   [facility]: Cardiomyopathy, Optimization, Rehabilitation, Education

## 2024-01-12 NOTE — ED NOTES
Pt alert, denies pain except for right knee pain when repositioned. Oral cares performed and partial linen change performed. Purewick in place. Call light within reach. Pt appears to breathe relatively comfortably on the BiPAP.

## 2024-01-12 NOTE — PLAN OF CARE
IMC status. A&O x 4 w/ intermittent confusion. AVSS on 3 L NC. Ax2 w/ lift. Tele SB. Tolerating regular diet, thin liquids. Lung sounds diminished bilat. Voiding adequately via purewick. No BM this shift, passing flatus. 3+ edema on LE bilat. Blanchable redness to coccyx, mepi in place. Yeast rash under breast bilat, interdry in place. Denies pain, nausea, SOB. VBGs Q6H.

## 2024-01-12 NOTE — PLAN OF CARE
Goal Outcome Evaluation:    Orientations: A&O x2-3, intermittently disoriented and restless overnight, more clear this morning when awake.   Vitals/Pain: VSS on AVAPS 40%, switched to 3 L NC at 0630  Tele: Sinus kristina, drops to 30's at times while sleeping but does not sustain, MD notified  Lines/Drains: PIV x1 SL  Skin/Wounds: Scattered bruising, blanchable redness to heels/coccyx, small scabs to R shin and toe, rash under breasts and abd folds  GI/: purewick in place, No BM  Labs: Trop 129, 125 MD notified  VBGs improving  Ambulation/Assist: Not OOB  Sleep Quality: Good

## 2024-01-12 NOTE — PROGRESS NOTES
[facility] [place]  Hospitalist Progress Note   01/12/2024          Assessment and Plan:       Eros Ontiveros is a 89 year old female with arthritis who was admitted on 1/11/2024 for further evaluation of shortness of breath.      Patient is hypertensive upon arrival to the ED with a BP of 147/100 and intermittently tachypneic with a RR 33 requiring 3-5 LPM. EMS report she had oxygenations in 62% ORA upon arrival. She was placed on a 15L non-rebreather en route. She reports three weeks of progressively worsening SOB and productive cough (green sputum). Associated symptoms include worsening lower extremity edema, decreased appetite, fatigue, altered mental status (confusion and disorientation started on 01/08) and hematuria. Denies recent fevers, chest pain, N/V, abdominal pain, bowel changes or recent syncopal episodes. Last bowel movement 01/10. Family unclear if this bowel movement was normal. Granddaughter notes patient was seen in a virtual visit approximately one week ago for her worsening shortness of breath where she was prescribed a Z-pack. She completed the course of antibiotics without relief of her symptoms. Granddaughter states patient has had an unintentional weight loss of 25 lbs since Thanksgiving. She also states patient has a several year history of exertional shortness of breath and LE edema at her baseline that has never been fully evaluated. Patient is noted to have had minimal PO intake since 01/05.   BNP 9,857. Trop 146. EKG sinus with PACs.   Leukocytosis and thrombocytosis noted with a WBC 11.9 and .   Negative lactate. BMP notable for a Na 149, Cl 109, Cr 1.28, BUN 41.5, GFR 40, albumin 3.2 and AST 87.   pH 7.32, pCO2 73, Bicarb 38. COVID, Influenza and RSV negative.   CXR demonstrated new dense consolidation at the right upper lobe. Increased consolidation at the left lung base as well that may also represent airspace consolidation. Small bibasilar pleural fluid. Normal  cardiac silhouette. Bilateral shoulder DJD.      Upon chart review, patient appears to have had concerns of cough and wheezing since at least 04/2023. VBGs at that time demonstrated a pH 7.39, pCO2 52, Bicarb 31. She is also noted to have a history of pedal edema for which she was on 20 mg lasix at one point. No known cardiac history, per daughter.      Acute Hypoxic/Hypercapnic Respiratory Failure   Community Acquired Pneumonia, bilateral   Acute Toxic Metabolic encephalopathy   Leukocytosis from above.  Physical deconditioning from medical illness, senile frailty.  *Baseline patient doesn't require supplemental oxygen. Initially found by EMS with SpO2 62% ORA and started on 15L non-rebreather en route. Currently requiring BiPAP. Currently at 40% FiO2.  *Several week history of cough and shortness of breath. Upon chart review, may be closer to a year. WBC 11.9. Lactate negative. CXR demonstrated new dense consolidation at the right upper lobe. Increased consolidation at the left lung base as well that may also represent airspace consolidation. Small bibasilar pleural fluid.   - Admit to IMC.   - Continue ceftriaxone and azithromycin.   - Start BiPAP. Currently at 40% FiO2. Repeat VBG pH 7.25, pCO2 80, Bicarb 35. Again repeated and pH 7.32, pCO2 69 and Bicarb 36. VBGs q 6 hrs or sooner as needed for now.   - RT consulted, appreciate the cares.   - Respiratory assessment score q 4 hours.   - Procalcitonin 0.37. Repeat lactates obtained and negative.   - DuoNebs scheduled, antitussives PRN.   - CT PE 01/11 ordered and demonstrated no pulmonary emboli. Rounded area of consolidation in the right upper lobe measuring about 4.5 cm could represent pneumonia with a possible underlying mass. Additional areas of groundglass and patchy opacities in the right lung, likely due to pneumonia. Small bilateral pleural effusions.   - Continue to monitor for improvement and consider consulting pulmonology if respiratory status  continues to decline.   Consider CT chest in 1-2 months to assess mass.   Trend WBC count, fever curve.  Follow procalcitonin level.  - PT/OT/SW.      Severe LVH w/ No LV Outflow Obstruction   Concern for HCM vs cardiac amyloid  Elevated Troponins - suspect demand ischemia   LE Edema  Hypertension    Moderate Aortic Stenosis   Asymptomatic sinus bradycardia  *Patient has no known cardiac history, per family, and takes no cardiac medications currently. Upon chart review, has taken lasix in the past.   - Repeat troponin downtrending at 129.   - Echocardiogram 01/11 demonstrated the left ventricle is normal in size. There is severe concentric left ventricular hypertrophy, with evidence for asymmetric septal hypertrophy. Septal thickness measured up to 2.5 cm. LVOT obstruction was not identified. Left ventricular systolic function is normal. The visual ejection fraction is 60-65%. Diastolic Doppler findings (E/E' ratio and/or other parameters) suggest left ventricular filling pressures are increased. Moderate valvular aortic stenosis. Mean AV gradient 20 mmHg, RODRIGUEZ 1.4 cm2. IVC diameter and respiratory changes fall into an intermediate range suggesting an RA pressure of 8 mmHg. Consider infiltrative CM and hypertrophic CM.  - Cardiology consulted given echocardiogram findings, appreciate the cares. 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 No family history of hypertrophic cardiomyopathy and kids are all adopted  Her sibs no HCM.   -- Cardiology following, appreciate comanagement.  Received IV Lasix on 1/11, switch to oral torsemide 10 mg twice daily on 1/12 per cardiology.  Strict intake output monitoring, daily weights.  Telemetry monitoring.  Follow TSH, magnesium levels     Hypoalbuminemia   *3.2 01/11. Patient noted to have lost 25 lbs since Thanksgiving. She hasn't had much intake over last seven days.   - Consulted nutrition.   - Continue to  monitor     BRANDT   Hematuria  *Cr 1.28, BUN 41.5. (Cr 0.98  and BUN 23.8 04/2023).  *CT PE obtained 01/11 that demonstrated an indeterminate lesion of the lower pole of the right kidney measuring 1.4 cm could represent a cyst with proteinaceous or hemorrhagic material or solid mass.   *Patient noted to have low urine output until lasix given. Purewick in place for voiding needs.    *Patient's  reports recent hematuria.   Discontinue IV fluids.  non emergent renal ultrasound is suggested for further evaluation and nephrology referral.     CT concerning for endometrial malignancy   *CT PE imaging 01/11 suspicious for endometrial malignancy with nodular and calcified enhancing areas in the endometrium and endometrial canal expanded to 37 mm.   - Pelvic ultrasound is suggested for further evaluation. will Consider pelvic US and GYN. referral once patient clinically improves.      Possible Colitis   *Patient family reports patient last had bowel movement 01/10. Unclear if changes to bowel movements. Patient's mental status diminished, however, denied pain and no focal tenderness upon examination.  *CT PE 01/11 demonstrated possible mild colitis involving the cecum, ascending colon and transverse colon, either infectious or inflammatory.   - Continue to monitor. Consider GI consult once patient clinically improves.      Hypernatremia   Hyperchloremia   *Na 149. Cl 109.   Received diuresis.  Monitor in AM     Elevated Liver Enzymes   *AST 87 01/11.   *Patient non-drinker and does not consume drugs.   *Upon chart review, patient has history of elevated liver enzymes. RUQ US 08/2012 demonstrated liver is echogenic consistent with fatty infiltration.   Continue to monitor    Thrombocytosis   *. Suspect reactive thrombocytosis due to chronic inflammation vs infection vs malignancy vs other.   - Continue to monitor     Macrocytosis   * 01/11.   Follow vitamin B12, folic acid monitor.     Recurrent Basal Cell  Carcinoma of Right Pinna   *Mohs surgery 05/2023.   - Noted.      Osteoarthritis   *Shoulders, knees and hands. Hx of use of cane and wheelchair while ambulating.   *Limited mobility at baseline.   - Hold PTA indomethacin until patient clinically improves.       Gout   - Hold PTA allopurinol until patient clinically improves.        Orders Placed This Encounter      Regular Diet Adult      DVT Prophylaxis: SCDs, ambulate.  Code Status: No CPR- Do NOT Intubate  Disposition: Expected discharge in greater than 2 days pending clinical improvement    Discussed with patient, bedside RN.  Updated patient's , granddaughter, family At the bedside  >51 minutes spent by me on the date of service doing chart review, history, exam, documentation & further activities per the note.      My Faulkner MD        Interval History:      Patient lying in bed.  Frail-appearing  Multiple family members including , granddaughter by the bedside.    Some improvement in shortness of breath.  Denies any chest pain or palpitations at this time.  No nausea or vomiting.  No new tingling or numbness.  Has been weaned off to 3 L nasal cannula this morning.  Per report intermittent heart rate in 30s to 40s.  Nonsustained.  Sinus bradycardia.  Afebrile.       Physical Exam:        Physical Exam   Temp:  [97.9  F (36.6  C)-98.9  F (37.2  C)] 98.1  F (36.7  C)  Pulse:  [43-79] 56  Resp:  [11-28] 20  BP: (100-181)/(50-89) 105/58  FiO2 (%):  [40 %] 40 %  SpO2:  [87 %-100 %] 94 %    Intake/Output Summary (Last 24 hours) at 1/12/2024 1652  Last data filed at 1/12/2024 1530  Gross per 24 hour   Intake 420 ml   Output 850 ml   Net -430 ml       Admission Weight: 76 kg (167 lb 8.8 oz)  Current Weight: 76 kg (167 lb 8.8 oz)    PHYSICAL EXAM  GENERAL: Patient is in no distress.  Awake and can answer simple questions  HEENT: Oropharynx pink  HEART: Regular rate and rhythm. S1S2. No murmurs  LUNGS: Bilateral decreased breath sounds, no wheezing  or crackles  Respirations unlabored  ABDOMEN: Soft, no abdominal tenderness, bowel sounds heard   NEURO: Moving all extremities  EXTREMITIES: 2+ pedal edema.  SKIN: Warm, dry. No rash  PSYCHIATRY Cooperative       Medications:         azithromycin  250 mg Intravenous Q24H    cefTRIAXone  1 g Intravenous Q24H    ipratropium - albuterol 0.5 mg/2.5 mg/3 mL  3 mL Nebulization 4x daily    miconazole   Topical BID    senna-docusate  1 tablet Oral BID    sodium chloride (PF)  3 mL Intracatheter Q8H    sodium chloride (PF)  3 mL Intracatheter Q8H    torsemide  10 mg Oral BID     - MEDICATION INSTRUCTIONS -, acetaminophen **OR** acetaminophen, benzonatate, calcium carbonate, artificial tears, glucose **OR** dextrose **OR** glucagon, guaiFENesin, lidocaine 4%, lidocaine 4%, lidocaine (buffered or not buffered), lidocaine (buffered or not buffered), nitroGLYcerin, nitroGLYcerin, - MEDICATION INSTRUCTIONS -, - MEDICATION INSTRUCTIONS -, senna-docusate **OR** senna-docusate, sodium chloride (PF), sodium chloride (PF)         Data:      All new lab and imaging data was reviewed.

## 2024-01-12 NOTE — PROGRESS NOTES
Pt started on bipap due to increased CO2 on VBG. Repeat VBG done, pt still with increased CO2. Bipap settings changed to AVAPS mode for better tidal volumes. Settings are VT: 450, EPAP +8, RR 16. FiO2 40%. Most recent VBG improved. Pt changed from full face mask to under the nose due to reddening on bridge of nose. Patient tolerating well.  Pt transported to and from CT with no issues. RT will continue to monitor.     Zuleima Herbert, RT,   1/11/2024 6:00 PM

## 2024-01-12 NOTE — PROGRESS NOTES
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  Lung mass  infection likely cannot exclude mass--will need follow-up ct chest in a few weeks  Hypernatremia and prerenal azotemia    Plan-demadex 10bid, liberalize free water (hyper Na)  Check abg  Echo was suppose to be strain but they didn't do   so will get a third echo  My quick look the RV is hypokinetic, good LV EF with some aortic stenosis    DAILY WEIGHTS    Pt much less sob               Interval History:     Less sob              Medications:   Scheduled Meds:    azithromycin  250 mg Intravenous Q24H    cefTRIAXone  1 g Intravenous Q24H    ipratropium - albuterol 0.5 mg/2.5 mg/3 mL  3 mL Nebulization 4x daily    miconazole   Topical BID    sodium chloride (PF)  3 mL Intracatheter Q8H    sodium chloride (PF)  3 mL Intracatheter Q8H    torsemide  10 mg Oral BID     PRN Meds: - MEDICATION INSTRUCTIONS -, acetaminophen **OR** acetaminophen, benzonatate, calcium carbonate, artificial tears, glucose **OR** dextrose **OR** glucagon, guaiFENesin, lidocaine 4%, lidocaine 4%, lidocaine (buffered or not buffered), lidocaine (buffered or not buffered), nitroGLYcerin, nitroGLYcerin, - MEDICATION INSTRUCTIONS -, - MEDICATION INSTRUCTIONS -, senna-docusate **OR** senna-docusate, sodium chloride (PF), sodium chloride (PF)         Physical Exam:   Blood pressure (!) 141/84, pulse 60, temperature 97.9  F (36.6  C), temperature source Oral, resp. rate 28, height 1.524 m (5'), weight 76 kg (167 lb 8.8 oz), SpO2 93%.  Vitals:    24 2204   Weight: 76 kg (167 lb 8.8 oz)       Intake/Output Summary (Last 24 hours) at 2024 1044  Last data filed at 2024 0540  Gross per 24 hour   Intake 0 ml   Output 150 ml   Net -150 ml           Vital Sign Ranges  Temperature Temp  Av.5  F (36.9  C)  Min: 97.9  F (36.6  " C)  Max: 98.9  F (37.2  C)   Blood pressure Systolic (24hrs), Av , Min:106 , Max:181        Diastolic (24hrs), Av, Min:60, Max:111      Pulse Pulse  Av.5  Min: 43  Max: 75   Respirations Resp  Av  Min: 11  Max: 33   Pulse oximetry SpO2  Av %  Min: 91 %  Max: 100 %             Constitutional: Awake, alert, cooperative, no apparent distress         Skin: No rash, petechia, cyanosis       Neck: No thyromegaly or adenopathy       Lungs: Rales ?dry vs wet bilat       Cardiovasc: Sinus kristina  gr 2-3 aortic stenosis murmur       Abdomen: Normal bowel sounds, soft, non-distended, non-tender, no hepatomegaly       Neuro: Alert and oriented x3, non focal exam       Extremities: Mild ankle edema less than yesterday       Other:             Data:     Recent Labs   Lab Test 24  0626 24  1010   WBC 11.8* 11.9*   HGB 13.5 12.7   * 107*    576*      Recent Labs   Lab Test 24  0626 24  1010   * 149*   POTASSIUM 4.4 4.6   CHLORIDE 108* 109*   BUN 34.3* 41.5*   CR 1.16* 1.28*     Recent Labs   Lab 24  0626 24  0358 24  0224 24  0121   GLC 78 86 81 89     Recent Labs   Lab Test 24  0626 24  1010   ALT 24 28   AST 71* 87*     No components found for: \"TROPONINIES\"    No results found for: \"CHOL\"  No results found for: \"HDL\"  No results found for: \"LDL\"  No results found for: \"TRIG\"  No results found for: \"CHOLHDLRATIO\"       TSH   Date Value Ref Range Status   2024 5.20 (H) 0.30 - 4.20 uIU/mL Final   2012 15.00 (H) 0.4 - 5.0 mU/L Final       "

## 2024-01-12 NOTE — PROGRESS NOTES
X-cover    Bradycardic to 30's while sleeping, otherwise 40-50s. On tele. BPs high/ normal.   - EKG  - monitor    Shakir Al MD'

## 2024-01-12 NOTE — CONSULTS
"CLINICAL NUTRITION SERVICES  -  ASSESSMENT NOTE      Recommendations Ordered by Registered Dietitian (RD):   Ordered 10 am vanilla Magic Cup  Ordered 2 pm cherry Gel+  Ordered RSWA    RD introduced self to pt and her family, discussed role in care. Discussed  fluids from meals to prevent feeling full off liquids before eating much. Discussed options for ONS. Pt willing to try cherry Gel+ and vanilla Magic Cup. Family requested help w/ meal ordering. RD will order RSWA.      Malnutrition:   % Weight Loss:  difficult to assess  % Intake:  </= 50% for >/= 1 month (severe malnutrition)-- x1-2 months per discussion w/ pt's granddaughter  Subcutaneous Fat Loss:  Orbital region mild depletion and Upper arm region mild depletion-- suspect partially age-related  Muscle Loss:  Temporal region mild depletion, Clavicle bone region moderate depletion, and Dorsal hand region moderate depletion-- suspect partially age-related  Fluid Retention:  Trace to moderate BLE edema     Malnutrition Diagnosis: at least Moderate malnutrition in the context of --  Acute illness or injury         REASON FOR ASSESSMENT  Eros Ontiveros is a 89 year old female seen by Registered Dietitian for Provider Order - Nutrition Education -  \"Heart Failure - Dietitian to instruct patient on 2 gram sodium diet; hypoalbuminemia\"        NUTRITION HISTORY  Information obtained from chart review and d/w pt and her family    PMH of Osteoarthritis, Recurrent Basal Cell Carcinoma of Right Pinna, Gout     Presented w/ worsening SOB, x2 months of cough and LLE edema  Admitted for: Acute Hypoxic/Hypercapnic Respiratory Failure, bilateral Community Acquired Pneumonia, Acute Toxic Metabolic encephalopathy, CT concerning for endometrial malignancy, Possible Colitis, among others    H&P = Patient noted to have lost 25 lbs since Thanksgiving. She hasn't had much intake over last seven days.     Pt's granddaughter reported that pt has had a noticeable wt loss " "since Thanksgiving to now. Pt reported her UBW was about 200#, but this was 3 years ago. She had her weight taken last night when admitted and it was 167#. She does not weigh herself often at baseline. Pt has mostly been eating soups, drinking coffee-- nothing substantial. Has not ever tried any oral protein supplements. Has a poor appetite.       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 She was eating a little pudding during time of visit. Pt endorsed feeling so full! Granddaughter reported pt had very little for lunch. She stated her blood sugar was low so she had to drink 3x orange juices.  Discussed  fluids from meals to prevent feeling full off liquids before eating much. Discussed options for ONS. Pt willing to try cherry Gel+ and vanilla Magic Cup. Family requested help w/ meal ordering. RD will order RSWA.         NUTRITION FOCUSED PHYSICAL ASSESSMENT FOR DIAGNOSING MALNUTRITION)  Yes-- visual, pt receiving help to eat her pudding         Observed:    Muscle wasting (refer to documentation in Malnutrition section)  Subcutaneous fat loss (refer to documentation in Malnutrition section)  Dry skin    Obtained from Chart/Interdisciplinary Team:  Currently requiring nasal cannula   May require placement when ready for discharge     1/11: ECHO =   Left ventricular systolic function is normal.  The visual ejection fraction is 60-65%.      ANTHROPOMETRICS  Height: 5' 0\"  Weight: 167 lbs 8.79 oz  Body mass index is 32.72 kg/m .  Weight " Status:  Obesity Grade I BMI 30-34.9  IBW: 45.5 kg  % IBW: 167%  Weight History:   Inadequate wt hx on file   01/11/24 76 kg (167 lb 8.8 oz)   08/27/12 81.8 kg (180 lb 4.7 oz)     Care everywhere--  94.3 kg (208 lb)  06/30/2022       LABS  Component Value Date    (H) 01/12/2024   BUN 34.3 (H) 01/12/2024   CR 1.16 (H) 01/12/2024   AST 71 (H) 01/12/2024     MEDICATIONS  Reviewed       ASSESSED NUTRITION NEEDS PER APPROVED PRACTICE GUIDELINES:  Dosing Weight: 53 kg (adjusted, based on 76 kg-- 1/11)  Estimated Energy Needs: 9068-6942 kcal (25-30 Kcal/Kg)  Justification: maintenance  Estimated Protein Needs: 64-80 grams protein (1.2-1.5 g pro/Kg)  Justification: hypercatabolism with acute illness  Estimated Fluid Needs: per provider pending fluid status      MALNUTRITION:  % Weight Loss:  difficult to assess  % Intake:  </= 50% for >/= 1 month (severe malnutrition)-- x1-2 months per discussion w/ pt's granddaughter  Subcutaneous Fat Loss:  Orbital region mild depletion and Upper arm region mild depletion-- suspect partially age-related  Muscle Loss:  Temporal region mild depletion, Clavicle bone region moderate depletion, and Dorsal hand region moderate depletion-- suspect partially age-related  Fluid Retention:  Trace to moderate BLE edema     Malnutrition Diagnosis: at least Moderate malnutrition in the context of --  Acute illness or injury        NUTRITION DIAGNOSIS:  Inadequate oral intake related to poor appetite as evidenced by poor intakes since thanksgiving (soups, coffee)        NUTRITION INTERVENTIONS  Recommendations / Nutrition Prescription  Ordered 10 am vanilla Magic Cup  Ordered 2 pm cherry Gel+  Ordered RSWA  RD introduced self to pt and her family, discussed role in care. Discussed  fluids from meals to prevent feeling full off liquids before eating much. Discussed options for ONS. Pt willing to try cherry Gel+ and vanilla Magic Cup. Family requested help w/ meal ordering. RD will order  VÍCTOR.       Implementation  Collaboration with other nutrition professionals  Medical food supplement therapy  Nutrition education       Nutrition Goals  Pt to consume >50% of 3 meals/day       MONITORING AND EVALUATION:  Progress towards goals will be monitored and evaluated per protocol and Practice Guidelines      Sharonda Chen RD, LD   Pager: [phone]

## 2024-01-12 NOTE — PROGRESS NOTES
"Clinical Swallow Evaluation (CSE):     01/12/24 1421   Appointment Info   Signing Clinician's Name / Credentials (SLP) Betty Fischer MS CCC-SLp   General Information   Onset of Illness/Injury or Date of Surgery 01/11/24   Patient/Family Therapy Goal Statement (SLP) To drink some coffee   Pertinent History of Current Problem   Per provider \"Eros Ontiveros is a 89 year old female with past medical history significant for arthritis who was admitted on 1/11/2024 for further evaluation of shortness of breath.\" Admitted with Acute Hypoxic/Hypercapnic Respiratory Failure; Community Acquired Pneumonia, bilateral; Acute Toxic Metabolic encephalopathy; Generalized Weakness; Leukocytosis; Severe LVH w/ No LV Outflow Obstruction; Concern for HCM vs cardiac amyloid; Elevated Troponins - suspect demand ischemia; LE Edema; Hypertension; Moderate Aortic Stenosis; Hypoalbuminemia; BRANDT; Hematuria; CT concerning for endometrial malignancy; Possible colitis; Elevated liver enzymes; Thrombocytosis; etc... see provider note for further.     SLP consulted for dysphagia. Per discussion with RN: in the context of current medical status, no obvious concerns reported with good tolerance of lunch today.     General Observations Pt alert, positioned uprigth in bed, voice is hoarse   Pain Assessment   Patient Currently in Pain No   Type of Evaluation   Type of Evaluation Swallow Evaluation   Oral Motor   Oral Musculature generally intact   Structural Abnormalities none present   Mucosal Quality good   Dentition (Oral Motor)   Dentition (Oral Motor) natural dentition;adequate dentition   Facial Symmetry (Oral Motor)   Facial Symmetry (Oral Motor) WNL   Lip Function (Oral Motor)   Lip Range of Motion (Oral Motor) WNL   Tongue Function (Oral Motor)   Tongue ROM (Oral Motor) WNL   Jaw Function (Oral Motor)   Jaw Function (Oral Motor) WNL   Cough/Swallow/Gag Reflex (Oral Motor)   Soft Palate/Velum (Oral Motor) WNL   Volitional Throat Clear/Cough (Oral " Motor) WNL   Volitional Swallow (Oral Motor) WNL   Vocal Quality/Secretion Management (Oral Motor)   Vocal Quality (Oral Motor) hoarse   General Swallowing Observations   Past History of Dysphagia None per EMR; pt denies any difficulty swallowing or concerns for aspiration when asked.   Respiratory Support nasal cannula  (3L)   Current Diet/Method of Nutritional Intake (General Swallowing Observations, NIS) regular diet;thin liquids (level 0)   Swallowing Evaluation Clinical swallow evaluation   Clinical Swallow Evaluation   Feeding Assistance no assistance needed   Clinical Swallow Evaluation Textures Trialed thin liquids;solid foods   Clinical Swallow Eval: Thin Liquid Texture Trial   Mode of Presentation, Thin Liquids cup;straw;self-fed   Volume of Liquid or Food Presented 3 oz coffee via cup, 1 oz water via straw   Oral Phase of Swallow premature pharyngeal entry   Pharyngeal Phase of Swallow intact   Diagnostic Statement x1 instance of R anterior spillage from cup rim; no overt clincial signs/sx aspiration noted with clear (but baseline hoarse) vocal quality; stable SP02.   Clinical Swallow Evaluation: Solid Food Texture Trial   Mode of Presentation self-fed   Volume Presented 1/2 pilo cracker   Oral Phase delayed AP movement   Pharyngeal Phase intact   Diagnostic Statement mildly prolonged but complete mastication + oral clearance with liquid wash; no overt clincial signs/sx aspiration noted.   Esophageal Phase of Swallow   Patient reports or presents with symptoms of esophageal dysphagia Yes   Esophageal comments pt denies issues but belching noted throughout evaluation, ?esophageal dysmotility   Swallowing Recommendations   Diet Consistency Recommendations regular diet;thin liquids (level 0)   Supervision Level for Intake distant supervision needed   Medication Administration Recommendations, Swallowing (SLP) whole, as tolerated   General Therapy Interventions   Planned Therapy Interventions Dysphagia  Treatment   Clinical Impression   Criteria for Skilled Therapeutic Interventions Met (SLP Eval) Yes, treatment indicated   SLP Diagnosis potential pharyngeal dysphagia   Risks & Benefits of therapy have been explained evaluation/treatment results reviewed;care plan/treatment goals reviewed;risks/benefits reviewed;current/potential barriers reviewed;participants voiced agreement with care plan;participants included;patient   Clinical Impression Comments   Clinical swallow evaluation completed with thin liquids, regular solids. Pt currently presents with a generally functional swallow - clinically tolerating regular/thin consistencies without overt clinical signs/sx aspiration, though cannot r/o pharyngeal dysphagia or aspiration (+silent aspiration) at bedside. Pt denies any/all concerns re: swallowing and demonstrated mildly prolonged mastication/oral clearance, notable laryngeal elevation to palpation, single swallows per bolus. Educated on signs/sx dysphagia/aspiraiton, which she denies. SLP to follow for short-course for PO tolerance with larger amount of PO with monitoring need for VFSS for further assessment, if indicated.     SLP Total Evaluation Time   Eval: oral/pharyngeal swallow function, clinical swallow Minutes (36413) 14   SLP Goals   Therapy Frequency (SLP Eval) 3 times/week   SLP Predicted Duration/Target Date for Goal Attainment 01/19/24   SLP Goals Swallow   SLP: Safely tolerate diet without signs/symptoms of aspiration Regular diet;Thin liquids;Independently   SLP Discharge Planning   SLP Plan meal f/u, ?VFSS vs d/c if tolerating   SLP Discharge Recommendation home with assist   SLP Rationale for DC Rec Pt near baseline, tolerating regular/thin diet   SLP Brief overview of current status  regular/thin with general safe swallow precautions and antireflux precautions   Total Session Time   Total Session Time (sum of timed and untimed services) 14

## 2024-01-13 LAB
ANION GAP SERPL CALCULATED.3IONS-SCNC: 4 MMOL/L (ref 7–15)
BASE EXCESS BLDV CALC-SCNC: 10.7 MMOL/L (ref -7.7–1.9)
BASE EXCESS BLDV CALC-SCNC: 11.8 MMOL/L (ref -7.7–1.9)
BUN SERPL-MCNC: 32.1 MG/DL (ref 8–23)
CALCIUM SERPL-MCNC: 8.3 MG/DL (ref 8.2–9.6)
CHLORIDE SERPL-SCNC: 105 MMOL/L (ref 98–107)
CREAT SERPL-MCNC: 1.25 MG/DL (ref 0.51–0.95)
DEPRECATED HCO3 PLAS-SCNC: 40 MMOL/L (ref 22–29)
EGFRCR SERPLBLD CKD-EPI 2021: 41 ML/MIN/1.73M2
GLUCOSE SERPL-MCNC: 107 MG/DL (ref 70–99)
HCO3 BLDV-SCNC: 41 MMOL/L (ref 21–28)
HCO3 BLDV-SCNC: 41 MMOL/L (ref 21–28)
HGB BLD-MCNC: 12.7 G/DL (ref 11.7–15.7)
MAGNESIUM SERPL-MCNC: 2.3 MG/DL (ref 1.7–2.3)
O2/TOTAL GAS SETTING VFR VENT: 3 %
O2/TOTAL GAS SETTING VFR VENT: 40 %
PCO2 BLDV: 74 MM HG (ref 40–50)
PCO2 BLDV: 87 MM HG (ref 40–50)
PH BLDV: 7.28 [PH] (ref 7.32–7.43)
PH BLDV: 7.35 [PH] (ref 7.32–7.43)
PO2 BLDV: 28 MM HG (ref 25–47)
PO2 BLDV: 44 MM HG (ref 25–47)
POTASSIUM SERPL-SCNC: 4.2 MMOL/L (ref 3.4–5.3)
SODIUM SERPL-SCNC: 149 MMOL/L (ref 135–145)

## 2024-01-13 PROCEDURE — 80048 BASIC METABOLIC PNL TOTAL CA: CPT | Performed by: HOSPITALIST

## 2024-01-13 PROCEDURE — 999N000157 HC STATISTIC RCP TIME EA 10 MIN

## 2024-01-13 PROCEDURE — 250N000013 HC RX MED GY IP 250 OP 250 PS 637: Performed by: INTERNAL MEDICINE

## 2024-01-13 PROCEDURE — 94660 CPAP INITIATION&MGMT: CPT

## 2024-01-13 PROCEDURE — 36415 COLL VENOUS BLD VENIPUNCTURE: CPT | Performed by: HOSPITALIST

## 2024-01-13 PROCEDURE — 85018 HEMOGLOBIN: CPT | Performed by: HOSPITALIST

## 2024-01-13 PROCEDURE — 250N000013 HC RX MED GY IP 250 OP 250 PS 637

## 2024-01-13 PROCEDURE — 99233 SBSQ HOSP IP/OBS HIGH 50: CPT | Performed by: HOSPITALIST

## 2024-01-13 PROCEDURE — 99232 SBSQ HOSP IP/OBS MODERATE 35: CPT | Performed by: INTERNAL MEDICINE

## 2024-01-13 PROCEDURE — 250N000013 HC RX MED GY IP 250 OP 250 PS 637: Performed by: HOSPITALIST

## 2024-01-13 PROCEDURE — 83735 ASSAY OF MAGNESIUM: CPT | Performed by: HOSPITALIST

## 2024-01-13 PROCEDURE — 36415 COLL VENOUS BLD VENIPUNCTURE: CPT

## 2024-01-13 PROCEDURE — 82803 BLOOD GASES ANY COMBINATION: CPT

## 2024-01-13 PROCEDURE — 250N000009 HC RX 250

## 2024-01-13 PROCEDURE — 94640 AIRWAY INHALATION TREATMENT: CPT | Mod: 76

## 2024-01-13 PROCEDURE — 94640 AIRWAY INHALATION TREATMENT: CPT

## 2024-01-13 PROCEDURE — 120N000013 HC R&B IMCU

## 2024-01-13 RX ORDER — AZITHROMYCIN 250 MG/1
250 TABLET, FILM COATED ORAL DAILY
Status: COMPLETED | OUTPATIENT
Start: 2024-01-13 | End: 2024-01-15

## 2024-01-13 RX ORDER — CEFDINIR 300 MG/1
300 CAPSULE ORAL DAILY
Status: DISCONTINUED | OUTPATIENT
Start: 2024-01-13 | End: 2024-01-21

## 2024-01-13 RX ORDER — TORSEMIDE 10 MG/1
10 TABLET ORAL DAILY
Status: DISCONTINUED | OUTPATIENT
Start: 2024-01-14 | End: 2024-01-22 | Stop reason: HOSPADM

## 2024-01-13 RX ORDER — CEFPODOXIME PROXETIL 200 MG/1
200 TABLET, FILM COATED ORAL DAILY
Status: DISCONTINUED | OUTPATIENT
Start: 2024-01-13 | End: 2024-01-13

## 2024-01-13 RX ADMIN — IPRATROPIUM BROMIDE AND ALBUTEROL SULFATE 3 ML: .5; 3 SOLUTION RESPIRATORY (INHALATION) at 20:00

## 2024-01-13 RX ADMIN — IPRATROPIUM BROMIDE AND ALBUTEROL SULFATE 3 ML: .5; 3 SOLUTION RESPIRATORY (INHALATION) at 11:57

## 2024-01-13 RX ADMIN — SENNOSIDES AND DOCUSATE SODIUM 1 TABLET: 50; 8.6 TABLET ORAL at 08:47

## 2024-01-13 RX ADMIN — MICONAZOLE NITRATE: 2 POWDER TOPICAL at 20:37

## 2024-01-13 RX ADMIN — IPRATROPIUM BROMIDE AND ALBUTEROL SULFATE 3 ML: .5; 3 SOLUTION RESPIRATORY (INHALATION) at 07:47

## 2024-01-13 RX ADMIN — TORSEMIDE 10 MG: 10 TABLET ORAL at 08:47

## 2024-01-13 RX ADMIN — IPRATROPIUM BROMIDE AND ALBUTEROL SULFATE 3 ML: .5; 3 SOLUTION RESPIRATORY (INHALATION) at 14:09

## 2024-01-13 RX ADMIN — SENNOSIDES AND DOCUSATE SODIUM 1 TABLET: 50; 8.6 TABLET ORAL at 20:37

## 2024-01-13 RX ADMIN — AZITHROMYCIN DIHYDRATE 250 MG: 250 TABLET ORAL at 16:33

## 2024-01-13 RX ADMIN — CEFDINIR 300 MG: 300 CAPSULE ORAL at 16:33

## 2024-01-13 ASSESSMENT — ACTIVITIES OF DAILY LIVING (ADL)
ADLS_ACUITY_SCORE: 54
ADLS_ACUITY_SCORE: 50
ADLS_ACUITY_SCORE: 54
ADLS_ACUITY_SCORE: 55
ADLS_ACUITY_SCORE: 54
ADLS_ACUITY_SCORE: 55
ADLS_ACUITY_SCORE: 54

## 2024-01-13 NOTE — PROVIDER NOTIFICATION
"MD Notification  Notified Person Name: Shakir Hutchinson MD   Notification Date/Time: 01/13/2024 at 0543  Notification Interaction: paged thru vocera message   RN message to MD:  \"Critical lab: PCO2 87, pH 7.28, not on BiPAP\"  MD response to RN:  Put patient back on BiPAP      Called [facility] and informed them that patient needs to be back on BiPAP.           "

## 2024-01-13 NOTE — PROGRESS NOTES
Approx 1130 loss of IV access. Pt refused further placement of PIV and requested for no further labs to be drawn. Pt requested for IV antibiotics to be switched to PO. Provider notified of patients refusal and wishes. Provider attempted to speak with family and patient at bedside about goals of care, although at that point family was no longer present. Further discussion of goals of care will take place tomorrow per provider.     MD Notification    Notified Person: MD    Notified Person Name: Dr. Faulkner     Notification Date/Time: 1131    Notification Interaction: paged    Purpose of Notification:    Lost IV access and pt is refusing further IV placement and labs to be drawn. Pt/family wondering if abx can be changed to PO? Family is open to comfort cares conversation as well. Daughter and granddaughter are here until 12p but can also be reached by phone. Said pt would be much more comfortable with no extra monitoring, etc.     Orders Received:    Provider to see pt and family at bedside. Will change abx to PO.   Comments:

## 2024-01-13 NOTE — PLAN OF CARE
[facility] status. A&O x 4. AVSS on 2-5 L O2, alternating between NC and oxymask. Ax2 w/ lift, turn/repo Q2H. Tele SB. Tolerating regular diet, thin liquids. Lung sounds diminished bilat. Voiding adequately via purewick, incontinence x3. No BM this shift, passing flatus. 3+ edema on LE bilat. Blanchable redness to coccyx, mepi in place. Yeast rash under breast bilat. Denies pain, nausea, SOB. Abx switched to PO, no PIV, no lab draws - see note.

## 2024-01-13 NOTE — PROGRESS NOTES
[id number]    [facility] [place]  [place]: A&Ox4, [place] wears hearing aids  Vitals/Pain: AVSS, back on BiPAP at 0600. Denied pain.  Tele: SR  Lines/Drains: 1 PIV x SL  LS: clear, diminshed  GI/: BS normoactive, x0 BM this shift. Pt having adequate urine output throughout shift. Purewick in place.  Labs: VBG q 6h  Ambulation/Assist: A1-2 with turns and repositioning  Skin/Wounds: scattered bruising, blanchable redness on coccyx and heels, rash under breasts and abd folds

## 2024-01-13 NOTE — PROVIDER NOTIFICATION
"MD Notification  Notified Person Name: Shakir Hutchinson   Notification Date/Time: 01/13/2024 at 0213  Notification Interaction: paged thru vocera message   RN message to MD: \"FYI patient's VBG result just got in. pH is 7.35, PCO2 is 74, pO2 28, Bicarb 41, Base Excess 11.8. She is on 3 Lpm oxymask right now. She was weaned off from BiPAP yesterday at 0630 in the morning.\"   MD response to RN: \"looks good\"   Orders Received: n/a          "

## 2024-01-13 NOTE — PROGRESS NOTES
[facility]    Cardiology Consultation - Progress Note     Date of Admission:  1/11/2024  Date of Service: 01/13/24    Reason for Consult   Reason for consult: heart failure    History of Present Illness   Eros Ontiveros is a 89 year old female who was admitted on 1/11/2024 for hypoxic respiratory failure in the setting of bilateral pneumonia and heart failure.     Overnight her pCO2 kathe to 87 and she was placed back on BiPAP. HR stable. BP elevated this morning at 150/96. She is back on 4 L O2 NC. Sodium slightly improved at 149. Net negative 1.1 L.    TTE yesterday showed normal LV strain.     Assessment & Plan   # Acute on chronic heart failure with preserved ejection fraction, LVEF 65-70%  # Left ventricular hypertrophy without LVOT obstruction, normal LV strain  # Moderate aortic stenosis  # Community acquired pneumonia  # Possible endometrial malignancy  # Hypernatremia    Ms. Ontiveros was resting when I saw her this afternoon, and her  requested that I do not wake her. I was able to examine her, and she appears still slightly hypervolemic. Unfortunately she remains hypernatremic with sodium 149. Per chart review it appears she is also refusing many cares. Although this is my first time meeting her, I think goals of care discussion would be appropriate.    Plan:  Agree with torsemide 10 mg reduced to daily dosing.   Encourage free water intake due to hypernatremia.   Not currently on HF GDMT, will not start given current hemodynamics.   Goals of care discussion as above.  Will continue to follow.    Grace Robertson MD    Primary Care Physician   Damir Collins    Patient Active Problem List   Diagnosis    SIRS (systemic inflammatory response syndrome) (H)    Acute respiratory failure with hypoxia (H)    Community acquired pneumonia, bilateral       Past Medical History   I have reviewed this patient's medical history and updated it with pertinent information if needed.    Past Medical History:   Diagnosis Date    Arthritis        Past Surgical History   I have reviewed this patient's surgical history and updated it with pertinent information if needed.  No past surgical history on file.    Prior to Admission Medications   Prior to Admission Medications   Prescriptions Last Dose Informant Patient Reported? Taking?   albuterol (PROAIR HFA) 108 (90 Base) MCG/ACT inhaler Unknown  No Yes   Sig: Inhale 2 puffs into the lungs every 4 hours as needed for shortness of breath   allopurinol (ZYLOPRIM) 300 MG tablet Past Week  Yes Yes   Sig: Take 300 mg by mouth daily   indomethacin (INDOCIN) 25 MG capsule Past Week  Yes Yes   Sig: Take 25 mg by mouth 2 times daily (with meals)      Facility-Administered Medications: None     Current Facility-Administered Medications   Medication Dose Route Frequency    azithromycin  250 mg Intravenous Q24H    cefTRIAXone  1 g Intravenous Q24H    ipratropium - albuterol 0.5 mg/2.5 mg/3 mL  3 mL Nebulization 4x daily    miconazole   Topical BID    senna-docusate  1 tablet Oral BID    sodium chloride (PF)  3 mL Intracatheter Q8H    sodium chloride (PF)  3 mL Intracatheter Q8H    torsemide  10 mg Oral BID     Current Facility-Administered Medications   Medication Last Rate    - MEDICATION INSTRUCTIONS -      - MEDICATION INSTRUCTIONS -      - MEDICATION INSTRUCTIONS -       Allergies   Allergies   Allergen Reactions    Penicillins Unknown       Social History    reports that she has never smoked. She does not have any smokeless tobacco history on file. She reports that she does not drink alcohol and does not use drugs.    Family History   No family history on file.    Review of Systems   The comprehensive Review of Systems is negative other than noted in the HPI or here.     Physical Exam   Vital Signs with Ranges  Temp:  [98  F (36.7  C)-98.2  F (36.8  C)] 98  F (36.7  C)  Pulse:  [55-79] 59  Resp:  [11-28] 15  BP: (100-150)/(50-96) 150/96  FiO2 (%):  [32 %] 32  "%  SpO2:  [87 %-99 %] 93 %  Wt Readings from Last 4 Encounters:   01/11/24 76 kg (167 lb 8.8 oz)   08/27/12 81.8 kg (180 lb 4.7 oz)     I/O last 3 completed shifts:  In: 900 [P.O.:900]  Out: 2000 [Urine:2000]      Vitals: BP (!) 150/96   Pulse 59   Temp 98  F (36.7  C)   Resp 15   Ht 1.524 m (5')   Wt 76 kg (167 lb 8.8 oz)   SpO2 93%   BMI 32.72 kg/m      General: Alert, oriented, in no acute distress  HEENT: PERRLA, mucous membranes moist  Neck: Normal JVP  CV: Regular rate and rhythm without murmur  Respiratory: Diffuse crackles.  GI: Normoactive bowel sounds; soft, non-tender abdomen  Neuro: No focal deficits appreciated  Extremities: Mild peripheral edema bilaterally    Recent Labs   Lab 01/13/24  0532 01/12/24  2338 01/12/24  1735 01/12/24  1205 01/12/24  0626 01/11/24  2214 01/11/24  1010   WBC  --   --   --   --  11.8*  --  11.9*   HGB 12.7  --   --   --  13.5  --  12.7   MCV  --   --   --   --  106*  --  107*   PLT  --   --   --   --  430  --  576*   *  --   --   --  151*  --  149*   POTASSIUM 4.2  --   --   --  4.4  --  4.6   CHLORIDE 105  --   --   --  108*  --  109*   CO2 40*  --   --   --  36*  --  33*   BUN 32.1*  --   --   --  34.3*  --  41.5*   CR 1.25*  --   --   --  1.16*  --  1.28*   GFRESTIMATED 41*  --   --   --  45*  --  40*   ANIONGAP 4*  --   --   --  7  --  7   ZAK 8.3  --   --   --  8.9  --  9.0   * 126* 99   < > 78   < > 102*   ALBUMIN  --   --   --   --  2.9*  --  3.2*   PROTTOTAL  --   --   --   --  7.1  --  7.7   BILITOTAL  --   --   --   --  0.4  --  0.4   ALKPHOS  --   --   --   --  70  --  80   ALT  --   --   --   --  24  --  28   AST  --   --   --   --  71*  --  87*    < > = values in this interval not displayed.     No results for input(s): \"CHOL\", \"HDL\", \"LDL\", \"TRIG\", \"CHOLHDLRATIO\" in the last 81823 hours.  Recent Labs   Lab 01/13/24  0532 01/12/24  0626 01/11/24  1010   WBC  --  11.8* 11.9*   HGB 12.7 13.5 12.7   HCT  --  44.8 43.6   MCV  --  106* 107*   PLT  " "--  430 576*   B12  --   --  494     Recent Labs   Lab 24  0531 24  0109 24  1721   PHV 7.28* 7.35 7.37   PO2V 44 28 35   PCO2V 87* 74* 66*   HCO3V 41* 41* 39*     Recent Labs   Lab 24  1010   NTBNPI 9,857*     No results for input(s): \"DD\" in the last 168 hours.  No results for input(s): \"SED\", \"CRP\" in the last 168 hours.  Recent Labs   Lab 24  0626 24  1010    576*     Recent Labs   Lab 24  1534   TSH 5.20*     Recent Labs   Lab 24  1742   COLOR Light Yellow   APPEARANCE Clear   URINEGLC Negative   URINEBILI Negative   URINEKETONE Negative   SG 1.022   UBLD Trace*   URINEPH 5.0   PROTEIN Negative   NITRITE Negative   LEUKEST Negative   RBCU 4*   WBCU 1       Imaging:  Recent Results (from the past 48 hour(s))   XR Chest Port 1 View    Narrative    CHEST PORTABLE 1 VIEW   2024 10:24 AM     HISTORY: Cough x 3 weeks, dyspnea, hypoxia.    COMPARISON: 2023.      Impression    IMPRESSION: New dense consolidation at the right upper lobe. Correlate  with pneumonia versus other underlying airspace disease. Increased  consolidation at the left lung base as well that may also represent  airspace consolidation. Small bibasilar pleural fluid. Normal cardiac  silhouette. Bilateral shoulder DJD.    PRIYA BARRAZA MD         SYSTEM ID:  [id number]   Echocardiogram Complete   Result Value    LVEF  60-65%    Narrative    [id number]  [id number]  [id number]  [id number]^IJEOMA^DEV^SILVANA     [facility]  Echocardiography Laboratory  [address] [place] [address]     Name: LASHON COLLINS  MRN: [id number]  : 06/10/1933  Study Date: 2024 01:18 PM  Age: 89 yrs  Gender: Female  Patient Location: [facility]  Reason For Study: Heart Failure  Ordering Physician: DEV RAPHAEL  Referring Physician: Damir Collins  Performed By: Kelly Kaur     BSA: 1.8 m2  Height: 60 in  Weight: 180 lb  HR: 64  BP: 135/66 " mmHg  ______________________________________________________________________________  Procedure  Complete Portable Echo Adult. Optison (NDC #[id number]) given intravenously.  ______________________________________________________________________________  Interpretation Summary     The left ventricle is normal in size.  There is severe concentric left ventricular hypertrophy, with evidence for  asymmetric septal hypertrophy. Septal thickness measured up to 2.5 cm. LVOT  obstruction was not identified.  Left ventricular systolic function is normal.  The visual ejection fraction is 60-65%.  Diastolic Doppler findings (E/E' ratio and/or other parameters) suggest left  ventricular filling pressures are increased.  Moderate valvular aortic stenosis. Mean AV gradient 20 mmHg, RODRIGUEZ 1.4 cm2.  There is no comparison study available. Consider infiltrative CM and  hypertrophic CM.  ______________________________________________________________________________  Left Ventricle  The left ventricle is normal in size. There is severe concentric left  ventricular hypertrophy. Asymmetric septal hypertrophy. Septal thickness  measured up to 2.5 cm. LVOT obstruction was not identified. Left ventricular  systolic function is normal. The visual ejection fraction is 60-65%. Diastolic  Doppler findings (E/E' ratio and/or other parameters) suggest left ventricular  filling pressures are increased. Normal left ventricular wall motion.     Right Ventricle  The right ventricle is normal in structure, function and size.     Atria  Normal left atrial size. Right atrial size is normal. There is no atrial shunt  seen.     Mitral Valve  There is moderate mitral annular calcification. The mitral valve leaflets  appear thickened, but open well. There is trace mitral regurgitation.     Tricuspid Valve  The tricuspid valve is normal in structure and function. There is trace  tricuspid regurgitation. Right ventricular systolic pressure could not  be  approximated due to inadequate tricuspid regurgitation. IVC diameter and  respiratory changes fall into an intermediate range suggesting an RA pressure  of 8 mmHg.     Aortic Valve  No aortic regurgitation is present. Moderate valvular aortic stenosis. The  calculated aortic valve are is 1.4 cm^2. The peak AoV pressure gradient is  36.0 mmHg. The mean AoV pressure gradient is 20.0 mmHg.     Pulmonic Valve  The pulmonic valve is not well seen, but is grossly normal. There is trace  pulmonic valvular regurgitation.     Vessels  Normal size aorta.     Pericardium  There is no pericardial effusion.     Rhythm  Sinus rhythm was noted.  ______________________________________________________________________________  MMode/2D Measurements & Calculations  IVSd: 1.3 cm     LVIDd: 4.0 cm  LVIDs: 2.6 cm  LVPWd: 1.2 cm  FS: 35.3 %  LV mass(C)d: 176.2 grams  LV mass(C)dI: 98.7 grams/m2  Ao root diam: 3.3 cm  LA dimension: 4.3 cm  asc Aorta Diam: 3.7 cm  LA/Ao: 1.3  LVOT diam: 2.1 cm  LVOT area: 3.5 cm2  Ao root diam index Ht(cm/m): 2.2  Ao root diam index BSA (cm/m2): 1.8  Asc Ao diam index BSA (cm/m2): 2.1  Asc Ao diam index Ht(cm/m): 2.4  RWT: 0.60  TAPSE: 2.1 cm     Doppler Measurements & Calculations  MV E max reynaldo: 79.1 cm/sec  MV A max reynaldo: 139.0 cm/sec  MV E/A: 0.57  MV dec time: 0.30 sec     Ao V2 max: 300.0 cm/sec  Ao max P.0 mmHg  Ao V2 mean: 205.0 cm/sec  Ao mean P.0 mmHg  Ao V2 VTI: 64.1 cm  RODRIGUEZ(I,D): 1.4 cm2  RODRIGUEZ(V,D): 1.3 cm2  LV V1 max P.0 mmHg  LV V1 max: 112.0 cm/sec  LV V1 VTI: 25.2 cm  SV(LVOT): 87.3 ml  SI(LVOT): 48.9 ml/m2  PA acc time: 0.12 sec  AV Reynaldo Ratio (DI): 0.37  RODRIGUEZ Index (cm2/m2): 0.76  E/E' av.0  Lateral E/e': 18.2  Medial E/e': 17.7  RV S Reynaldo: 12.2 cm/sec     ______________________________________________________________________________  Report approved by: Araseli Torre 2024 03:09 PM         CT Chest (PE) Abdomen Pelvis w Contrast    Narrative    CT CHEST PE ABDOMEN  PELVIS W CONTRAST 1/11/2024 3:35 PM    CLINICAL HISTORY: Acute hypoxic respiratory failure; CAP; HF  TECHNIQUE: CT angiogram chest and routine CT abdomen pelvis with IV  contrast. Arterial phase through the chest and venous phase through  the abdomen and pelvis. 2D and 3D MIP reconstructions were preformed  by the CT technologist. Dose reduction techniques were used.     CONTRAST: 91mL [id number]    COMPARISON: None.    FINDINGS:  ANGIOGRAM CHEST: Pulmonary arteries are normal caliber and negative  for pulmonary emboli. Thoracic aorta is negative for dissection. No CT  evidence of right heart strain.     LUNGS AND PLEURA: Right upper lobe consolidative opacity measuring 4.5  cm (series 3, image 47). Surrounding patchy, groundglass opacity in  the right lung apex and groundglass opacities in the right lower lobe.  Small bilateral pleural effusions, right greater than left. Mild  bronchial wall thickening in the lung bases, likely inflammatory. No  CT evidence for pulmonary edema.    MEDIASTINUM/AXILLAE: No lymphadenopathy. No thoracic aortic aneurysm.  Mild cardiomegaly. No coronary artery calcifications. No pericardial  effusion. Tiny hiatal hernia.    HEPATOBILIARY: No focal lesions of the liver. No calcified gallstones  or biliary ductal dilatation.    PANCREAS: Normal.    SPLEEN: Normal.    ADRENAL GLANDS: Normal.    KIDNEYS/BLADDER: Indeterminate 1.5 cm hyperdense lesion at the lower  pole anterior cortex of the right kidney (series 7, image 84). Tiny  nonobstructing left renal calculus measuring 2 mm. No hydronephrosis  or perinephric stranding bilaterally.    BOWEL: Mild wall thickening and enhancement in the cecum, ascending  colon and transverse colon may be inflammatory. No small bowel or  colonic obstruction. Sigmoid diverticulosis. Normal appendix.    LYMPH NODES: No lymphadenopathy in the abdomen and pelvis.    PELVIC ORGANS: Indeterminate calcified mass in the endometrium  measuring 2.0 cm. The  endometrial lining is expanded to 3.7 cm with  nodular enhancement. Fibroid in the anterior fundus measuring 1.4 cm.    OTHER: No free fluid or fluid collections. No free air.    MUSCULOSKELETAL: Degenerative changes in the bilateral shoulders,  right hip 1and spine. No suspicious lesions in the bones.      Impression    IMPRESSION:  1.  No pulmonary emboli.  2.  Rounded area of consolidation in the right upper lobe measuring  about 4.5 cm could represent pneumonia with a possible underlying  mass. Additional areas of groundglass and patchy opacities in the  right lung, likely due to pneumonia. Small bilateral pleural  effusions. Recommend a follow-up chest CT in 8-12 weeks to ensure  resolution.  3.  Possible mild colitis involving the cecum, ascending colon and  transverse colon, either infectious or inflammatory. Please correlate  clinically.  4.  Finding suspicious for endometrial malignancy with nodular and  calcified enhancing areas in the endometrium and endometrial canal  expanded to 37 mm. Pelvic ultrasound is suggested for further  evaluation.  5.  An indeterminate lesion of the lower pole of the right kidney  measuring 1.4 cm could represent a cyst with proteinaceous or  hemorrhagic material or solid mass. Nonemergent renal ultrasound is  suggested for further evaluation.    LAUREANO YOUNG MD         SYSTEM ID:  [id number]   Echocardiogram Limited   Result Value    LVEF  65-70%    Narrative    [id number]  [id number]  [id number]  [id number]^CITLALI^CORBY     [facility]  Echocardiography Laboratory  [address] [place] [address]     Name: LASHON COLLINS  MRN: [id number]  : 06/10/1933  Study Date: 2024 07:42 AM  Age: 89 yrs  Gender: Female  Patient Location: [facility]  Reason For Study: Amyloidosis  Ordering Physician: CORBY GODWIN  Referring Physician: CORBY GODWIN  Performed By: Shaina Canela     BSA: 1.7 m2  Height: 60 in  Weight: 167 lb  HR: 50  BP: 106/72  mmHg  ______________________________________________________________________________  Procedure  Limited Echo Adult.  ______________________________________________________________________________  Interpretation Summary     Hyperdynamic left ventricular function  The visual ejection fraction is 65-70%.  There is moderate concentric left ventricular hypertrophy.  Septal thickness in diastole measures 1.3 cm.  Global peak LV longitudinal strain is averaged at -22.8%. This is within  reported normal limits (normal <-18%). There is no regional variation in  strain pattern consistent with cardiac amyloidosis pattern. CMR and PYP scan  can be considered for definitive diagnosis.  The right ventricular systolic function is normal.  The left atrium is severely dilated.  Moderate valvular aortic stenosis.  The inferior vena cava was normal in size with preserved respiratory  variability.  There is no pericardial effusion.     Similar findings to study dated 1/11/2024 except septal measurements not as  severe. As above, consider cardiac MRI. Strain performed today is within  normal limits with no regional variation / apical sparing pattern.  ______________________________________________________________________________  Left Ventricle  The left ventricle is normal in size. There is moderate concentric left  ventricular hypertrophy. Septal thickness measures 1.3 cm. Echo findings are  not consistent with left ventricular outflow obstruction. Hyperdynamic left  ventricular function. The visual ejection fraction is 65-70%. Grade II or  moderate diastolic dysfunction. Global peak LV longitudinal strain is averaged  at -22.8%. This is within reported normal limits (normal <-18%). Normal left  ventricular wall motion.     Right Ventricle  The right ventricle is normal size. The right ventricular systolic function is  normal.     Atria  The left atrium is severely dilated. Right atrium not well visualized.     Mitral Valve  There  is moderate mitral annular calcification. There is mild (1+) mitral  regurgitation.     Tricuspid Valve  The tricuspid valve is normal in structure and function. There is mild (1+)  tricuspid regurgitation. The right ventricular systolic pressure is  approximated at 35mmHg plus the right atrial pressure.     Aortic Valve  The aortic valve is trileaflet with aortic valve sclerosis. There is mild (1+)  aortic regurgitation. The mean AoV pressure gradient is 26.1 mmHg. The  calculated aortic valve are is 1.2 cm^2. Moderate valvular aortic stenosis.     Pulmonic Valve  The pulmonic valve is normal in structure and function. There is mild (1+)  pulmonic valvular regurgitation.     Vessels  The aortic root is normal size. Normal size ascending aorta. The inferior vena  cava was normal in size with preserved respiratory variability.     Pericardium  There is no pericardial effusion.     Rhythm  The rhythm was sinus bradycardia.  ______________________________________________________________________________  MMode/2D Measurements & Calculations  IVSd: 1.3 cm     LVIDd: 4.2 cm  LVIDs: 2.0 cm  LVPWd: 0.99 cm  FS: 53.0 %  LV mass(C)d: 165.4 grams  LV mass(C)dI: 95.7 grams/m2  Ao root diam: 3.2 cm  asc Aorta Diam: 3.7 cm  LVOT diam: 2.0 cm  LVOT area: 3.1 cm2  Ao root diam index Ht(cm/m): 2.1  Ao root diam index BSA (cm/m2): 1.9  Asc Ao diam index BSA (cm/m2): 2.1  Asc Ao diam index Ht(cm/m): 2.4  LA Volume (BP): 98.5 ml     LA Volume Index (BP): 56.9 ml/m2  RV Base: 3.7 cm  RWT: 0.47  TAPSE: 2.3 cm     Doppler Measurements & Calculations  MV E max emily: 119.0 cm/sec  MV A max emily: 155.6 cm/sec  MV E/A: 0.76  MV max P.9 mmHg  MV mean P.2 mmHg  MV V2 VTI: 55.2 cm  MVA(VTI): 1.6 cm2  MV P1/2t max emily: 120.3 cm/sec  MV P1/2t: 100.7 msec  MVA(P1/2t): 2.2 cm2  MV dec slope: 350.1 cm/sec2  MV dec time: 0.30 sec  Ao V2 max: 336.1 cm/sec  Ao max P.0 mmHg  Ao V2 mean: 242.4 cm/sec  Ao mean P.1 mmHg  Ao V2 VTI: 76.8  cm  RODRIGUEZ(I,D): 1.2 cm2  RODRIGUEZ(V,D): 1.2 cm2  LV V1 max P.3 mmHg  LV V1 max: 125.8 cm/sec  LV V1 VTI: 29.1 cm  SV(LVOT): 89.6 ml  SI(LVOT): 51.8 ml/m2  PA V2 max: 94.6 cm/sec  PA max PG: 3.6 mmHg  PA acc time: 0.08 sec  TR max reynaldo: 299.4 cm/sec  TR max P.9 mmHg  AV Reynaldo Ratio (DI): 0.37     RODRIGUEZ Index (cm2/m2): 0.67  E/E' av.2  Lateral E/e': 16.8  Medial E/e': 17.6     Measurements from [facility]  LV GLS Endo Peak A2C (AS): -21.8 %  LV GLS Endo Peak A3C (AS): -23.1 %  LV GLS Endo Peak A4C (AS): -23.5 %  LV GLS Endo Peak Avg (AS): -22.8 %     ______________________________________________________________________________  Report approved by: Araseli Machado 2024 01:01 PM               Medical Decision Making

## 2024-01-13 NOTE — PROGRESS NOTES
[facility]  Hospitalist Progress Note   01/13/2024          Assessment and Plan:       Eros Ontiveros is a 89 year old female with history of  arthritis admitted on 1/11/2020 for shortness of breath.    Acute hypoxic hypercapnic respiratory failure likely multifactorial from community-acquired pneumonia, heart failure exacerbation, possible sleep apnea, obesity hypoventilation, deconditioning.   Right upper lobe consolidation likely from pneumonia versus underlying mass  Community-acquired pneumonia.   Possible PORFIRIO/OHS.  --Presented with progressively worsening shortness of breath for 3 weeks prior to admission.Per EMS report O2 sats in 60s on room air, was also found to be hypotensive.  Placed on 15 L nonrebreather and brought to the ED.  On admission afebrile.  Bilateral crackles with tachypnea.  pH 7.26, pCO2 77.  WBC 11.9. Lactic acid 1.4.  Procalcitonin 0.37  Influenza A, influenza B, RSV, COVID-19 PCR negative.  Chest x-ray with dense consolidation at right upper lobe.  Increased consolidation at the left lung base as well.    CT chest -no pulmonary embolism.  Rounded area of consolidation in the right upper lobe measuring about 4.5 cm could represent pneumonia with a possible underlying  mass. Additional areas of groundglass and patchy opacities in the right lung, likely due to pneumonia. Small bilateral pleural effusions.   Blood cultures no growth to date.  -- Patient admitted to [facility].  Was on BiPAP,  Received diuresis with intravenous Lasix and IV antibiotics with which having some improvement in hypoxia and symptoms.  Continues to have hypercapnia.    --Discussed with patient and family this morning at length continue intermittent BiPAP [per report patient reluctant to.  Supplemental nasal oxygen intermittently.  --Continue IV azithromycin, IV ceftriaxone.  Wean off oxygen as able to.  Follow blood gas this afternoon.  Continue DuoNebs.  Aggressive incentive spirometry, encourage  ambulation.  --Will need to follow-up with pulmonology as outpatient.  Sleep studies as outpatient.  Will need close follow-up CT imaging in 8 to 10 weeks or earlier if symptomatic.  Trend WBC count, fever curve      Addendum.  12 PM.  Patient lost access, per report patient and family would like no further IV/lab.  Will switch to oral antibiotics.  Monitor closely, revisit goals of care discussion on 1/14    Acute exacerbation of heart failure with preserved EF.  Elevated troponin likely from demand ischemia in the setting of heart failure exacerbation, hypoxia.  Severe concentric LVH with evidence of asymmetric septal hypertrophy.  Moderate aortic stenosis.  Asymptomatic sinus bradycardia.  Hypertension, chronic lower extremity edema on Lasix  --On admission report of shortness of breath, no chest pain.  proBNP 9857.  EKG normal sinus rhythm.  Troponin 146 > 129 > 125  - Echocardiogram 01/12 with severe concentric left ventricular hypertrophy.  LVEF of 60 to 65%.  Moderate AS consider infiltrative cardiomyopathy.  See report for details.  -- Received diuresis with intravenous Lasix, switch to oral torsemide twice daily on 1/12.  --Cardiology followed, recommend oral torsemide.  Appreciate input.  -- Continue oral torsemide, decrease to once a day given hypercapnia, hypernatremia  Strict intake output monitoring, daily weights.  Continue telemetry monitoring.      Acute encephalopathy likely from metabolic, underlying cognitive impairment.  Concern for cognitive impairment at baseline.  Physical deconditioning from medical illness, senile frailty.  Report of generalized weakness, poor intake.  Hard of hearing.  Some forgetfulness.  On 1/13 patient having hearing aids, much more awake.   UA no concerns for infection.  Magnesium 2.3.  CK 42.  -- Address medical issues as listed.  PT, OT evaluation.  Will likely need TCU for rehabilitation.    Fall precautions, delirium precautions.    Acute kidney injury-likely  multifactorial.  History of CKD stage 2  Incidental right kidney lesion.  No urinary symptoms.  UA with nitrite negative, leukocyte esterase negative, trace blood.  *On admission Cr 1.28, BUN 41.5. (Cr 0.98  and BUN 23.8 04/2023).  *CT on admission demonstrated an indeterminate lesion of the lower pole of the right kidney measuring 1.4 cm could represent a cyst with proteinaceous or hemorrhagic material or solid mass.   --Address medical issues.  Monitor renal function closely.  Consider outpatient renal ultrasound, CT findings discussed with patient and family.    Hyperchloremic hypernatremia  Sodium 149, Chloride 109.  BMP in a.m.    Hypoalbuminemia likely from poor oral intake, dilutional.  Nutrition following, on supplements.  Monitor periodically    Concern for endometrial malignancy on imaging.  CT on admission with Finding suspicious for endometrial malignancy with nodular and  calcified enhancing areas in the endometrium and endometrial canal  expanded to 37 mm.  -- Findings discussed with patient's family.  Will consider pelvic ultrasound prior to discharge once stable from respiratory standpoint.    Concern for colitis on imaging.  CT on admission with Possible mild colitis involving the cecum, ascending colon and  transverse colon, either infectious or inflammatory.   -- Patient tolerating oral intake.  No diarrhea.  Abdomen soft.  Monitor.    Elevated AST  AST 87 on admission.  No history of alcohol use.  On chart review has had elevated AST in the past.  Monitor closely  Can consider ultrasound liver as outpatient    Thrombocytosis likely reactive improved  *.  Improving.  Monitor    Macrocytosis   * 01/11.  Vitamin B12 within normal limits.     Recurrent Basal Cell Carcinoma of Right Pinna   *Mohs surgery 05/2023.   - Noted.      Osteoarthritis   *Shoulders, knees and hands. Hx of use of cane and wheelchair while ambulating.   *Limited mobility at baseline.   - Hold PTA indomethacin until  patient clinically improves.       Gout   - Hold PTA allopurinol until patient clinically improves.      Goals of care discussion.  Discussed goals of care with patient, family on 1/12 -  DNR/DNI status.    Orders Placed This Encounter      Regular Diet Adult      DVT Prophylaxis: SCDs, ambulate.  Code Status: No CPR- Do NOT Intubate  Disposition: Expected discharge in greater than 2 days pending clinical improvement    Discussed with patient, bedside RN.  Updated patient's , granddaughter, family at the bedside  >51 minutes spent by me on the date of service doing chart review, history, exam, documentation & further activities per the note.      My Faulkner MD        Interval History:      Patient lying in bed.  Frail-appearing  Multiple family members including , granddaughter by the bedside.    Some improvement in shortness of breath.  Denies any chest pain or palpitations at this time.  No nausea or vomiting.  No new tingling or numbness.  Has been weaned off to 3 L nasal cannula this morning.  Was on BiPAP briefly this morning  Afebrile.       Physical Exam:        Physical Exam   Temp:  [98  F (36.7  C)-98.2  F (36.8  C)] 98  F (36.7  C)  Pulse:  [56-71] 71  Resp:  [11-27] 25  BP: (105-150)/(58-96) 130/67  FiO2 (%):  [32 %] 32 %  SpO2:  [92 %-99 %] 97 %    Intake/Output Summary (Last 24 hours) at 1/12/2024 1652  Last data filed at 1/12/2024 1530  Gross per 24 hour   Intake 420 ml   Output 850 ml   Net -430 ml       Admission Weight: 76 kg (167 lb 8.8 oz)  Current Weight: 76 kg (167 lb 8.8 oz)    PHYSICAL EXAM  GENERAL: Patient is in no distress.  Awake and can answer simple questions  HEENT: Oropharynx pink  HEART: Regular rate and rhythm. S1S2. No murmurs  LUNGS: Bilateral decreased breath sounds, no wheezing or crackles  Respirations unlabored  ABDOMEN: Soft, no abdominal tenderness, bowel sounds heard   NEURO: Moving all extremities  EXTREMITIES: 2+ pedal edema.  SKIN: Warm, dry. No  rash  PSYCHIATRY Cooperative       Medications:         azithromycin  250 mg Oral Daily    cefpodoxime  200 mg Oral Daily    ipratropium - albuterol 0.5 mg/2.5 mg/3 mL  3 mL Nebulization 4x daily    miconazole   Topical BID    senna-docusate  1 tablet Oral BID    sodium chloride (PF)  3 mL Intracatheter Q8H    sodium chloride (PF)  3 mL Intracatheter Q8H    torsemide  10 mg Oral BID     - MEDICATION INSTRUCTIONS -, acetaminophen **OR** acetaminophen, benzonatate, calcium carbonate, artificial tears, glucose **OR** dextrose **OR** glucagon, guaiFENesin, lidocaine 4%, lidocaine 4%, lidocaine (buffered or not buffered), lidocaine (buffered or not buffered), nitroGLYcerin, nitroGLYcerin, - MEDICATION INSTRUCTIONS -, - MEDICATION INSTRUCTIONS -, senna-docusate **OR** senna-docusate, sodium chloride (PF), sodium chloride (PF)         Data:      All new lab and imaging data was reviewed.

## 2024-01-14 ENCOUNTER — APPOINTMENT (OUTPATIENT)
Dept: OCCUPATIONAL THERAPY | Facility: CLINIC | Age: 89
DRG: 193 | End: 2024-01-14
Payer: COMMERCIAL

## 2024-01-14 PROCEDURE — 999N000157 HC STATISTIC RCP TIME EA 10 MIN

## 2024-01-14 PROCEDURE — 94640 AIRWAY INHALATION TREATMENT: CPT

## 2024-01-14 PROCEDURE — G0463 HOSPITAL OUTPT CLINIC VISIT: HCPCS

## 2024-01-14 PROCEDURE — 272N000202 HC AEROBIKA WITH MANOMETER

## 2024-01-14 PROCEDURE — 120N000013 HC R&B IMCU

## 2024-01-14 PROCEDURE — 97535 SELF CARE MNGMENT TRAINING: CPT | Mod: GO

## 2024-01-14 PROCEDURE — 97165 OT EVAL LOW COMPLEX 30 MIN: CPT | Mod: GO

## 2024-01-14 PROCEDURE — 250N000013 HC RX MED GY IP 250 OP 250 PS 637

## 2024-01-14 PROCEDURE — 250N000009 HC RX 250

## 2024-01-14 PROCEDURE — 250N000013 HC RX MED GY IP 250 OP 250 PS 637: Performed by: HOSPITALIST

## 2024-01-14 PROCEDURE — 99232 SBSQ HOSP IP/OBS MODERATE 35: CPT | Performed by: HOSPITALIST

## 2024-01-14 RX ORDER — IPRATROPIUM BROMIDE AND ALBUTEROL SULFATE 2.5; .5 MG/3ML; MG/3ML
3 SOLUTION RESPIRATORY (INHALATION) EVERY 4 HOURS PRN
Status: DISCONTINUED | OUTPATIENT
Start: 2024-01-14 | End: 2024-01-22 | Stop reason: HOSPADM

## 2024-01-14 RX ORDER — IPRATROPIUM BROMIDE AND ALBUTEROL SULFATE 2.5; .5 MG/3ML; MG/3ML
3 SOLUTION RESPIRATORY (INHALATION) 4 TIMES DAILY PRN
Status: DISCONTINUED | OUTPATIENT
Start: 2024-01-14 | End: 2024-01-14 | Stop reason: DRUGHIGH

## 2024-01-14 RX ADMIN — SENNOSIDES AND DOCUSATE SODIUM 1 TABLET: 50; 8.6 TABLET ORAL at 20:27

## 2024-01-14 RX ADMIN — IPRATROPIUM BROMIDE AND ALBUTEROL SULFATE 3 ML: .5; 3 SOLUTION RESPIRATORY (INHALATION) at 07:21

## 2024-01-14 RX ADMIN — SENNOSIDES AND DOCUSATE SODIUM 1 TABLET: 50; 8.6 TABLET ORAL at 10:59

## 2024-01-14 RX ADMIN — TORSEMIDE 10 MG: 10 TABLET ORAL at 10:59

## 2024-01-14 RX ADMIN — MICONAZOLE NITRATE: 2 POWDER TOPICAL at 20:28

## 2024-01-14 RX ADMIN — CEFDINIR 300 MG: 300 CAPSULE ORAL at 10:59

## 2024-01-14 RX ADMIN — AZITHROMYCIN DIHYDRATE 250 MG: 250 TABLET ORAL at 11:00

## 2024-01-14 ASSESSMENT — ACTIVITIES OF DAILY LIVING (ADL)
ADLS_ACUITY_SCORE: 48
ADLS_ACUITY_SCORE: 50
ADLS_ACUITY_SCORE: 48
ADLS_ACUITY_SCORE: 50
ADLS_ACUITY_SCORE: 44
ADLS_ACUITY_SCORE: 48
ADLS_ACUITY_SCORE: 50
ADLS_ACUITY_SCORE: 48

## 2024-01-14 NOTE — PROGRESS NOTES
[facility]  Hospitalist Progress Note   01/14/2024          Assessment and Plan:       Eros Ontiveros is a 89 year old female with history of arthritis admitted on 1/11/2020 for shortness of breath.    Acute hypoxic hypercapnic respiratory failure likely multifactorial from community-acquired pneumonia, heart failure exacerbation, possible sleep apnea, obesity hypoventilation, deconditioning - Improving    Right upper lobe consolidation likely from pneumonia versus underlying mass  Community-acquired pneumonia.   Possible PORFIRIO/OHS.  --Presented with progressively worsening shortness of breath for 3 weeks prior to admission. Per EMS report O2 sats in 60s on room air, was also found to be hypotensive.  Placed on 15 L nonrebreather and brought to the ED.  On admission afebrile.  Bilateral crackles with tachypnea.  --pH 7.26, pCO2 77. WBC 11.9. Lactic acid 1.4.  Procalcitonin 0.37  Influenza A, influenza B, RSV, COVID-19 PCR negative.  --Chest x-ray with dense consolidation at right upper lobe.  Increased consolidation at the left lung base as well.    CT chest -no pulmonary embolism.  Rounded area of consolidation in the right upper lobe measuring about 4.5 cm could represent pneumonia with a possible underlying  mass. Additional areas of groundglass and patchy opacities in the right lung, likely due to pneumonia. Small bilateral pleural effusions.   Blood cultures no growth to date.  -- Patient admitted to [facility].  Was on BiPAP,  Received diuresis with intravenous Lasix and IV antibiotics with which having improvement in hypoxia, symptoms.  Continue to have hypercapnia with pCO2 of 87.   -- On 1/13 mental status improving.  Patient lost IV access, and declining IV placement.  Declines BiPAP, lab draw.  Patient reports would not like any medical intervention, would like to be comfortable and go home.   --See goals of care discussion below.  --On IV azithromycin and IV ceftriaxone from 1/11, switch to oral  azithromycin and oral cefdinir on 1/13.   DuoNebs as needed.  Diuresis as below.  Wean off oxygen as able to.  Aggressive incentive spirometry, encourage ambulation.  Will need close follow-up CT imaging in 8 to 10 weeks for resolution pending goals of care discussion.    Goals of care discussion.  -- On 1/13 mental status improving.  Patient lost IV access, and declining IV placement.  Declines BiPAP, lab draw.  Patient reports would not like any medical intervention, would like to be comfortable and go home.  DNR, DNI.  -- At length discussion with patient's , daughters and granddaughter.  Patient's daughter/granddaughter in support of patient's decisions of no IV access, no lab draws and oral antibiotics and plan to transition with comfort care.  --Patient  reports would like patient to get better and stronger and continue medical care and consider rehabilitation despite education and patient clear of issues.   [unsure if patient's  completely able to comprehend ongoing issues].  --Hospice consult requested for input on home hospice to help family with decision making.  Palliative care consult requested -for further discussion.    Acute exacerbation of heart failure with preserved EF.  Elevated troponin likely from demand ischemia in the setting of heart failure exacerbation, hypoxia.  Severe concentric LVH without LVOT obstruction   Moderate aortic stenosis.  Asymptomatic sinus bradycardia.  Hypertension, chronic lower extremity edema on Lasix  --On admission report of shortness of breath, no chest pain.  proBNP 9857.  EKG normal sinus rhythm.  Troponin 146 > 129 > 125  - Echocardiogram 01/12 with severe concentric left ventricular hypertrophy.  LVEF of 60 to 65%.  Moderate AS consider infiltrative cardiomyopathy.  See report for details.  -- Received diuresis with intravenous Lasix, switch to oral torsemide twice daily on 1/12.    --Cardiology followed, recommend oral torsemide.  Appreciate  input.  -- Continue oral torsemide, decrease to once a day given hypercapnia, hypernatremia  Strict intake output monitoring, daily weights.  Discontinue telemetry monitoring.      Acute encephalopathy likely from metabolic, underlying cognitive impairment.  Concern for cognitive impairment at baseline.  Physical deconditioning from medical illness, senile frailty.  Report of generalized weakness, poor intake.  Hard of hearing.  Some forgetfulness.  Lives at home with her .  On 1/13 patient having hearing aids, much more awake.   UA no concerns for infection.  Magnesium 2.3.  CK 42.  -- Address medical issues as listed.  Mental status improving.  --Patient intermittently refusing to turn in position, ambulate out of bed.  --Patient reports would like to go home and be comfortable.  Patient's  would like to transition to rehab.  Daughter and granddaughter involved in care, see goals of care.   consult for hospice input requested.  Fall precautions, delirium precautions.  PT, OT-if patient agrees.    Acute kidney injury-likely multifactorial.  History of CKD stage 2  Incidental right kidney lesion.  No urinary symptoms.  UA with nitrite negative, leukocyte esterase negative, trace blood.  *On admission Cr 1.28, BUN 41.5. (Cr 0.98  and BUN 23.8 04/2023).  *CT on admission demonstrated an indeterminate lesion of the lower pole of the right kidney measuring 1.4 cm could represent a cyst with proteinaceous or hemorrhagic material or solid mass.   --Address medical issues.  Monitor renal function closely.  Consider outpatient renal ultrasound  PENDINGgoals of care, CT findings discussed with patient and family.    Hyperchloremic hypernatremia  Sodium 149, Chloride 109.  Decrease torsemide, encourage oral intake.    Hypoalbuminemia likely from poor oral intake, dilutional.  Nutrition following, on supplements.    Concern for endometrial malignancy on imaging.  CT on admission with Finding  suspicious for endometrial malignancy with nodular and  calcified enhancing areas in the endometrium and endometrial canal  expanded to 37 mm.  -- Findings discussed with patient's family.  Patient's reports would not like any further workup.  Patient's  would like to consider ultrasound.  Await goals of care input.    Concern for colitis on imaging.  CT on admission with Possible mild colitis involving the cecum, ascending colon and  transverse colon, either infectious or inflammatory.   -- Patient tolerating oral intake.  No diarrhea.  Abdomen soft.  Monitor.    Elevated AST  AST 87 on admission.  No history of alcohol use.  On chart review has had elevated AST in the past.  Monitor closely  Can consider ultrasound liver as outpatient pending goals of care.    Thrombocytosis likely reactive improved  *.  Improving.  Monitor    Macrocytosis   * 01/11.  Vitamin B12 within normal limits.     Recurrent Basal Cell Carcinoma of Right Pinna   *Mohs surgery 05/2023.   - Noted.      Osteoarthritis   *Shoulders, knees and hands. Hx of use of cane and wheelchair while ambulating.   *Limited mobility at baseline.   - Hold PTA indomethacin, discontinued on discharge given kidney injury.  Tylenol as needed.  As needed Robaxin ordered.     Gout   - Hold PTA allopurinol until patient clinically improves.     Orders Placed This Encounter      Regular Diet Adult      DVT Prophylaxis: SCDs, ambulate.  Code Status: No CPR- Do NOT Intubate  Disposition: Expected discharge in 1-2 days pending clinical improvement    Discussed with patient, bedside RN.  Updated patient's , granddaughter, daughter at the bedside  >51 minutes spent by me on the date of service doing chart review, history, exam, documentation & further activities per the note.      My Faulkner MD        Interval History:      Patient lying in bed.  Frail-appearing  This morning have rounded on patient.  Subsequently again able to round on  patient with  by the bedside.  This afternoon again met with patient,  and daughter and granddaughter.    Some improvement in shortness of breath.  Denies any chest pain or palpitations at this time.  No nausea or vomiting.  No new tingling or numbness.  Has been weaned off to nasal oxygen.  Afebrile.    Patient mental status improving.  Reports would like to discharge home soon.  Reports would not like any further blood draws or IV access placement.    Afebrile.       Physical Exam:        Physical Exam   Temp:  [97.5  F (36.4  C)-98.6  F (37  C)] 97.9  F (36.6  C)  Pulse:  [61-78] 78  Resp:  [14-34] 34  BP: ()/(47-80) 123/68  SpO2:  [89 %-99 %] 96 %    Intake/Output Summary (Last 24 hours) at 1/12/2024 1652  Last data filed at 1/12/2024 1530  Gross per 24 hour   Intake 420 ml   Output 850 ml   Net -430 ml       Admission Weight: 76 kg (167 lb 8.8 oz)  Current Weight: 76 kg (167 lb 8.8 oz)    PHYSICAL EXAM  GENERAL: Patient is in no distress.  Awake and can answer simple questions  HEART: Regular rate and rhythm. S1S2. No murmurs  LUNGS: Bilateral decreased breath sounds, no wheezing or crackles  Respirations unlabored  ABDOMEN: Soft, no abdominal tenderness, bowel sounds heard   NEURO: Moving all extremities  EXTREMITIES: 1+ pedal edema.  SKIN: Warm, dry. No rash  PSYCHIATRY Cooperative       Medications:         azithromycin  250 mg Oral Daily    cefdinir  300 mg Oral Daily    ipratropium - albuterol 0.5 mg/2.5 mg/3 mL  3 mL Nebulization 4x daily    miconazole   Topical BID    senna-docusate  1 tablet Oral BID    sodium chloride (PF)  3 mL Intracatheter Q8H    sodium chloride (PF)  3 mL Intracatheter Q8H    torsemide  10 mg Oral Daily     - MEDICATION INSTRUCTIONS -, acetaminophen **OR** acetaminophen, benzonatate, calcium carbonate, artificial tears, glucose **OR** dextrose **OR** glucagon, guaiFENesin, lidocaine 4%, lidocaine 4%, lidocaine (buffered or not buffered), lidocaine (buffered or  not buffered), nitroGLYcerin, nitroGLYcerin, - MEDICATION INSTRUCTIONS -, - MEDICATION INSTRUCTIONS -, senna-docusate **OR** senna-docusate, sodium chloride (PF), sodium chloride (PF)         Data:      All new lab and imaging data was reviewed.

## 2024-01-14 NOTE — PROGRESS NOTES
[id number]    [facility] Status  Orientation: A&Ox4  Vitals/Pain: AVSS on 3 Lpm/NC-oxy mask. Denied pain.  Tele: SB  Lines/Drains: no IV access - Provider aware  LS: diminished  GI/: BS normoactive, x0 BM this shift. Pt having adequate urine output throughout shift. Purewick in place.  Ambulation/Assist: Turn and reposition every 2h, A2  Skin/Wounds: scattered bruises, blanchable redness to coccyx, mepilex in place. Rash on groin and under breast treated with Miconazole powder and interdry in place.  Plan: Possible off [facility] status today.  Other Info: Pt refused blood draws and placement of PIV.

## 2024-01-14 NOTE — PLAN OF CARE
[facility] status discontinued approx 1320. A&O x 4. AVSS on 3 L O2, alternating between NC and oxymask. Ax2 w/ gb/w, turn/repo Q2H when in bed. Tele SB. Tolerating regular diet, thin liquids. Lung sounds diminished bilat. Voiding adequately via purewick, no BM this shift. 3+ edema on LE bilat. Blanchable redness to coccyx, mepi in place. Yeast rash under breast bilat. Denies pain, nausea, SOB. Goals of care discussion w/ family today - see provider note.

## 2024-01-14 NOTE — PROGRESS NOTES
01/14/24 1500   Appointment Info   Signing Clinician's Name / Credentials (OT) Radha Ruby, OTR/L   Living Environment   People in Home spouse   Current Living Arrangements house   Home Accessibility stairs to enter home   Number of Stairs, Main Entrance 2   Stair Railings, Main Entrance railings safe and in good condition   Living Environment Comments Pt lives in home w/ spouse, reports a few small steps to enter, then all needs met on main floor. Pt has walk in shower w/ shower chair and grab bars.   Self-Care   Usual Activity Tolerance good   Current Activity Tolerance fair   Equipment Currently Used at Home walker, standard   Fall history within last six months yes   Number of times patient has fallen within last six months 3   Activity/Exercise/Self-Care Comment Pt typically independent w/ ADL tasks at home, ambulates w/ FWW   Instrumental Activities of Daily Living (IADL)   IADL Comments Spouse manages cooking, cleaning and laundry. Pt does not drive. Manages her own meds w/ a pill box   General Information   Onset of Illness/Injury or Date of Surgery 01/11/24   Referring Physician Lula Lynn PA-C   Patient/Family Therapy Goal Statement (OT) get stronger   Additional Occupational Profile Info/Pertinent History of Current Problem Eros Ontiveros is a 89 year old female with history of arthritis admitted on 1/11/2020 for shortness of breath.     Acute hypoxic hypercapnic respiratory failure likely multifactorial from community-acquired pneumonia, heart failure exacerbation, possible sleep apnea, obesity hypoventilation, deconditioning - Improving    Right upper lobe consolidation likely from pneumonia versus underlying mass  Community-acquired pneumonia. Possible PORFIRIO/OHS.   Existing Precautions/Restrictions fall;oxygen therapy device and L/min  (4L NC)   Cognitive Status Examination   Orientation Status orientation to person, place and time   Cognitive Status Comments Pt AOx4 and following  commands, does need some repetition at times. Will screen further.   Visual Perception   Visual Impairment/Limitations WFL   Sensory   Sensory Quick Adds sensation intact   Pain Assessment   Patient Currently in Pain No   Range of Motion Comprehensive   Comment, General Range of Motion B shoulder deficits at baseline   Strength Comprehensive (MMT)   Comment, General Manual Muscle Testing (MMT) Assessment generalized weakness in BUE ~4/5   Bed Mobility   Bed Mobility supine-sit   Supine-Sit [place] (Bed Mobility) minimum assist (75% patient effort);verbal cues   Assistive Device (Bed Mobility) bed rails   Transfers   Transfers sit-stand transfer;bed-chair transfer   Transfer Skill: Bed to Chair/Chair to Bed   Bed-Chair [place] (Transfers) minimum assist (75% patient effort);2 person assist;verbal cues   Assistive Device (Bed-Chair Transfers) standard walker   Sit-Stand Transfer   Sit-Stand [place] (Transfers) minimum assist (75% patient effort);verbal cues   Assistive Device (Sit-Stand Transfers) walker, standard   Balance   Balance Comments pt slightly unsteady in standing, good sitting balance w/ SBA   Activities of Daily Living   BADL Assessment/Intervention bathing;upper body dressing;lower body dressing;grooming;toileting   Bathing Assessment/Intervention   [place] Level (Bathing) moderate assist (50% patient effort)   Comment, (Bathing) per clinical judgement   Upper Body Dressing Assessment/Training   Comment, (Upper Body Dressing) per clinical judgement   [place] Level (Upper Body Dressing) minimum assist (75% patient effort)   Lower Body Dressing Assessment/Training   [place] Level (Lower Body Dressing) moderate assist (50% patient effort)   Grooming Assessment/Training   Position (Grooming) supported sitting   [place] Level (Grooming) set up   Toileting   Comment, (Toileting) per clinical judgement   [place] Level (Toileting) moderate assist (50% patient effort)    Clinical Impression   Criteria for Skilled Therapeutic Interventions Met (OT) Yes, treatment indicated   OT Diagnosis decreased I/ADL independence   OT Problem List-Impairments impacting ADL problems related to;activity tolerance impaired;balance;cognition;mobility;strength   Assessment of Occupational Performance 3-5 Performance Deficits   Identified Performance Deficits decreased independence w/ dressing, bathing, functional mobility and toileting   Planned Therapy Interventions (OT) ADL retraining;IADL retraining;cognition;strengthening;transfer training;home program guidelines;progressive activity/exercise;risk factor education   Clinical Decision Making Complexity (OT) problem focused assessment/low complexity   Risk & Benefits of therapy have been explained evaluation/treatment results reviewed;care plan/treatment goals reviewed;risks/benefits reviewed;current/potential barriers reviewed;participants voiced agreement with care plan;participants included;patient;spouse/significant other   OT Total Evaluation Time   OT Eval, Low Complexity Minutes (54506) 10   OT Goals   Therapy Frequency (OT) 5 times/week   OT Predicted Duration/Target Date for Goal Attainment 01/21/24   OT Goals Hygiene/Grooming;Upper Body Dressing;Lower Body Dressing;Toilet Transfer/Toileting;Cognition   OT: Hygiene/Grooming modified independent   OT: Upper Body Dressing Modified independent;including set-up/clothing retrieval   OT: Lower Body Dressing Modified independent;including set-up/clothing retrieval   OT: Toilet Transfer/Toileting Modified independent;toilet transfer;cleaning and garment management;using adaptive equipment   OT: Cognitive Patient/caregiver will verbalize understanding of cognitive assessment results/recommendations as needed for safe discharge planning   Interventions   Interventions Quick Adds Self-Care/Home Management   Self-Care/Home Management   Self-Care/Home Mgmt/ADL, Compensatory, Meal Prep Minutes (51936)  25   Symptoms Noted During/After Treatment (Meal Preparation/Planning Training) fatigue;shortness of breath   Treatment Detail/Skilled Intervention Pt in bed upon arrival, sleeping soundly but awakes to voice. AOx4, following commands w/ some inc time and additional cues needed. Inc time for room set up/line mgmt. VSS at rest on 4L NC. Min A and VC for supine<>sit. Good balance EOB. Ed on PLB and pt able to demo. Stood from EOB w/ Mod A initially and FWW. pt engaged in some weight shifting, then returned to sitting position. Stood again w/ Min Ax2 and completed pivot transfer to chair w/ Min Ax2. Pt fatiguing quickly, unable to tolerate further activity. Completed 2 g/h tasks w/ set up A while in chair. VSS after activity, /87, SpO2 95% and HR in the 70s. Left up in chair w/ all needs met, alarm on and RN updated.   OT Discharge Planning   OT Plan cog screen as able, progress standing activity tolerance for ADL tasks, dressing   OT Discharge Recommendation (DC Rec) Transitional Care Facility   OT Rationale for DC Rec Pt below baseline, limited by weakness, decreased activity tolerance and supplemental O2 needs. pt was previously independent and living at home w/ her spouse. Recommend TCU to progress I/ADL independence prior to return home.   OT Brief overview of current status Min Ax2 to chair, VSS but pt fatigues quickly w/ activity   Total Session Time   Timed Code Treatment Minutes 25   Total Session Time (sum of timed and untimed services) 35

## 2024-01-15 ENCOUNTER — APPOINTMENT (OUTPATIENT)
Dept: OCCUPATIONAL THERAPY | Facility: CLINIC | Age: 89
DRG: 193 | End: 2024-01-15
Payer: COMMERCIAL

## 2024-01-15 ENCOUNTER — APPOINTMENT (OUTPATIENT)
Dept: SPEECH THERAPY | Facility: CLINIC | Age: 89
DRG: 193 | End: 2024-01-15
Payer: COMMERCIAL

## 2024-01-15 PROCEDURE — 120N000013 HC R&B IMCU

## 2024-01-15 PROCEDURE — 97535 SELF CARE MNGMENT TRAINING: CPT | Mod: GO

## 2024-01-15 PROCEDURE — 99232 SBSQ HOSP IP/OBS MODERATE 35: CPT | Performed by: HOSPITALIST

## 2024-01-15 PROCEDURE — 92526 ORAL FUNCTION THERAPY: CPT | Mod: GN

## 2024-01-15 PROCEDURE — 250N000013 HC RX MED GY IP 250 OP 250 PS 637

## 2024-01-15 PROCEDURE — 250N000013 HC RX MED GY IP 250 OP 250 PS 637: Performed by: HOSPITALIST

## 2024-01-15 RX ADMIN — TORSEMIDE 10 MG: 10 TABLET ORAL at 08:59

## 2024-01-15 RX ADMIN — SENNOSIDES AND DOCUSATE SODIUM 1 TABLET: 50; 8.6 TABLET ORAL at 20:58

## 2024-01-15 RX ADMIN — MICONAZOLE NITRATE: 2 POWDER TOPICAL at 21:12

## 2024-01-15 RX ADMIN — SENNOSIDES AND DOCUSATE SODIUM 1 TABLET: 50; 8.6 TABLET ORAL at 08:59

## 2024-01-15 RX ADMIN — AZITHROMYCIN DIHYDRATE 250 MG: 250 TABLET ORAL at 09:00

## 2024-01-15 RX ADMIN — CEFDINIR 300 MG: 300 CAPSULE ORAL at 08:59

## 2024-01-15 ASSESSMENT — ACTIVITIES OF DAILY LIVING (ADL)
ADLS_ACUITY_SCORE: 44
ADLS_ACUITY_SCORE: 44
ADLS_ACUITY_SCORE: 48
ADLS_ACUITY_SCORE: 44
DEPENDENT_IADLS:: INDEPENDENT
ADLS_ACUITY_SCORE: 44

## 2024-01-15 NOTE — CONSULTS
Care Management Initial Consult    General Information  Assessment completed with: Family, Zeina-granddaughter  Type of CM/SW Visit: Initial Assessment    Primary Care Provider verified and updated as needed: Yes   Readmission within the last 30 days: no previous admission in last 30 days      Reason for Consult: end of life/hospice  Advance Care Planning:            Communication Assessment  Patient's communication style: spoken language (English or Bilingual)    Hearing Difficulty or Deaf: yes        Cognitive  Cognitive/Neuro/Behavioral: WDL  Level of Consciousness: alert  Arousal Level: opens eyes spontaneously  Orientation: oriented x 4  Mood/Behavior: calm, cooperative  Best Language: 0 - No aphasia  Speech: spontaneous, logical    Living Environment:   People in home: spouse  [place]  Current living Arrangements: house      Able to return to prior arrangements: no       Family/Social Support:  Care provided by: self  Provides care for: spouse  Marital Status:   Children          Description of Support System: Supportive    Support Assessment: Adequate social supports    Current Resources:   Patient receiving home care services: No     Community Resources: None  Equipment currently used at home: walker, standard  Supplies currently used at home: None    Employment/Financial:  Employment Status: retired        Financial Concerns: none   Referral to Financial Worker: No       Does the patient's insurance plan have a 3 day qualifying hospital stay waiver?  No    Lifestyle & Psychosocial Needs:  Social Determinants of Health     Food Insecurity: Not on file   Depression: Not on file   Housing Stability: Not on file   Tobacco Use: Not on file   Financial Resource Strain: Not on file   Alcohol Use: Not on file   Transportation Needs: Not on file   Physical Activity: Not on file   Interpersonal Safety: Not on file   Stress: Not on file   Social Connections: Not on file       Functional Status:  Prior to  admission patient needed assistance:   Dependent ADLs:: Ambulation-walker  Dependent IADLs:: Independent  Assesssment of Functional Status: Not at  functional baseline    Mental Health Status:  Mental Health Status: No Current Concerns       Chemical Dependency Status:  Chemical Dependency Status: No Current Concerns             Values/Beliefs:  Spiritual, Cultural Beliefs, Scientologist Practices, Values that affect care:                 Additional Information:  Writer was consulted for hospice and end of life discussions. Writer reviewed patient's chart. Eros Ontiveros is a 89 year old female with past medical history significant for arthritis who was admitted on 1/11/2024 for further evaluation of shortness of breath. Writer was consulted for hospice but patient would like to go home. At this time, patient is not able to go home as family states they are not able to help 24/7 with cares and patient and spouse would be unwilling to have caregivers come into the home. Patient will likely need facility placement for hospice. Writer did send a referral to [facility] for Hospice and LTC. Patient will be private pay as they have [facility] medicare advantage for insurance. Writer was able to verify address, PCP, insurance and contacts. Patient uses a walker in the home and all of the patient's needs are on one level. Patient's granddaughter gave out her email to SW at [email or web address] and writer sent LTC list, hospice facility list, and Hospice Agency list. Patient will need a facility due to not being able to go home. Family is willing to privately pay.     SW to follow for discharge planning with hospice.    Bobby Gaona [facility]  Care Management

## 2024-01-15 NOTE — PROGRESS NOTES
Palliative consult received. Will see following hospice consult if needs remain.    Kusum REINA, CNS  Palliative Medicine   [facility]  Securely message with Sandra

## 2024-01-15 NOTE — PLAN OF CARE
Goal Outcome Evaluation:    A&O x 4. VSS on 3L NC while awake, oxymask 3L while sleeping. Assist x1-2 gb/walker. Up in chair today. Regular diet, tolerating well. No PIV access, providers aware. Denies nausea/pain/SOB. Adequate UOP. No BM during shift. Continue plan of care.

## 2024-01-15 NOTE — PROGRESS NOTES
[facility]  Hospitalist Progress Note   01/15/2024          Assessment and Plan:       Eros Ontiveros is a 89 year old female with history of arthritis admitted on 1/11/2020 for shortness of breath.    Acute hypoxic hypercapnic respiratory failure likely multifactorial from community-acquired pneumonia, heart failure exacerbation, possible sleep apnea, obesity hypoventilation, deconditioning - Improving    Right upper lobe consolidation likely from pneumonia versus underlying mass  Community-acquired pneumonia.   Possible PORFIRIO/OHS.  --Presented with progressively worsening shortness of breath for 3 weeks prior to admission. Per EMS report O2 sats in 60s on room air, was also found to be hypotensive.  Placed on 15 L nonrebreather and brought to the ED.  On admission afebrile.  Bilateral crackles with tachypnea.  --pH 7.26, pCO2 77. WBC 11.9. Lactic acid 1.4.  Procalcitonin 0.37  Influenza A, influenza B, RSV, COVID-19 PCR negative.  --Chest x-ray with dense consolidation at right upper lobe.  Increased consolidation at the left lung base as well.    CT chest -no pulmonary embolism.  Rounded area of consolidation in the right upper lobe measuring about 4.5 cm could represent pneumonia with a possible underlying  mass. Additional areas of groundglass and patchy opacities in the right lung, likely due to pneumonia. Small bilateral pleural effusions.   Blood cultures no growth to date.  -- Was on BiPAP,  Received diuresis with intravenous Lasix and IV antibiotics with which having improvement in hypoxia, symptoms.  Continued to have hypercapnia with pCO2 of 87.   -- On 1/13 mental status improving.  Patient lost IV access, and declining IV placement.  Declined BiPAP, lab draw.  Patient reports would not like any medical intervention, would like to be comfortable and go home.   --See goals of care discussion below.  --On IV azithromycin and IV ceftriaxone from 1/11, switched to oral azithromycin and oral  cefdinir on 1/13.   -- Continue oral  cefdinir for 10 days and oral azithromycin to complete 5-day course.  DuoNebs as needed.  Diuresis as below.  Wean off oxygen as able to.  Aggressive incentive spirometry, encourage ambulation.  Will need close follow-up CT imaging in 8 to 10 weeks for resolution pending goals of care discussion.    Goals of care discussion.  -- On 1/13 mental status improving.  Patient lost IV access, and declining IV placement.  Declines BiPAP, lab draw.  Patient reports would not like any medical intervention, would like to be comfortable and go home.  DNR, DNI.  --I have had at length discussion with patient's , daughters and granddaughter.  Patient's daughter/granddaughter in support of patient's decisions of no IV access, no lab draws and oral antibiotics and plan to transition with comfort care.  --Patient  reports would like patient to get better and stronger and continue medical care and consider rehabilitation despite education and patient clear of issues.   [unsure if patient's  completely able to comprehend ongoing issues].  --Hospice consult requested for input on home hospice to help family with decision making.  Palliative care consult requested -for further discussion if able to assist.    Acute exacerbation of heart failure with preserved EF.  Elevated troponin likely from demand ischemia in the setting of heart failure exacerbation, hypoxia.  Severe concentric LVH without LVOT obstruction   Moderate aortic stenosis.  Asymptomatic sinus bradycardia.  Hypertension, chronic lower extremity edema on Lasix  --On admission report of shortness of breath, no chest pain.  proBNP 9857.  EKG normal sinus rhythm.  Troponin 146 > 129 > 125  --Echocardiogram 01/12 with severe concentric left ventricular hypertrophy.  LVEF of 60 to 65%.  Moderate AS consider infiltrative cardiomyopathy.  See report for details.  -- Received diuresis with intravenous Lasix, switch to oral  torsemide twice daily on 1/12.    --Cardiology followed, recommend oral torsemide.  Appreciate input.  -- Continue oral torsemide, decrease to once a day given hypercapnia, hypernatremia  Will discontinue intake output monitoring, telemetry, and weight.      Acute encephalopathy likely from metabolic, underlying cognitive impairment.  Concern for cognitive impairment at baseline.  Physical deconditioning from medical illness, senile frailty.  Report of generalized weakness, poor intake.  Hard of hearing.  Some forgetfulness.  Lives at home with her .  On 1/13 patient having hearing aids, much more awake.   UA no concerns for infection.  Magnesium 2.3.  CK 42.  -- Address medical issues as listed.  Mental status improving. Per report Patient intermittently refusing to turn in position, ambulate out of bed.  --Patient reports would like to go home and be comfortable. Patient's  would like to transition to rehab.  Daughter and granddaughter involved in care, see goals of care. Discussed with  for assistance with transition of care.  Await hospice team input  Fall precautions, delirium precautions.  PT, OT-if patient agrees.    Acute kidney injury-likely multifactorial.  History of CKD stage 2  Incidental right kidney lesion.  No urinary symptoms.  UA with nitrite negative, leukocyte esterase negative, trace blood.  *On admission Cr 1.28, BUN 41.5. (Cr 0.98  and BUN 23.8 04/2023).  *CT on admission demonstrated an indeterminate lesion of the lower pole of the right kidney measuring 1.4 cm could represent a cyst with proteinaceous or hemorrhagic material or solid mass.   --Address medical issues.  Monitor renal function closely.  Consider outpatient renal ultrasound  pending goals of care, CT findings discussed with patient and family.    Hyperchloremic hypernatremia  Sodium 149, Chloride 109.  Decrease torsemide, encourage oral intake.    Hypoalbuminemia likely from poor oral intake,  dilutional.  Nutrition following, on supplements.    Concern for endometrial malignancy on imaging.  CT on admission with Finding suspicious for endometrial malignancy with nodular and  calcified enhancing areas in the endometrium and endometrial canal  expanded to 37 mm.  -- Findings discussed with patient's family.  Patient's reports would not like any further workup.  Patient's  would like to consider ultrasound. Await hospice team/further goals of care    Concern for colitis on imaging.  CT on admission with Possible mild colitis involving the cecum, ascending colon and  transverse colon, either infectious or inflammatory.   -- Patient tolerating oral intake.  No diarrhea.  Abdomen soft.  Monitor.    Elevated AST  AST 87 on admission.  No history of alcohol use.  On chart review has had elevated AST in the past.  Monitor closely  Can consider ultrasound liver as outpatient pending    Thrombocytosis likely reactive improved  *.  Improving.  Monitor    Macrocytosis   * 01/11.  Vitamin B12 within normal limits.     Recurrent Basal Cell Carcinoma of Right Pinna   *Mohs surgery 05/2023.   - Noted.      Osteoarthritis   *Shoulders, knees and hands. Hx of use of cane and wheelchair while ambulating.   *Limited mobility at baseline.   - Hold PTA indomethacin, discontinued on discharge given kidney injury.  Tylenol as needed.  As needed Robaxin ordered.     Gout   - Hold PTA allopurinol until patient clinically improves.     Orders Placed This Encounter      Regular Diet Adult      DVT Prophylaxis: SCDs, ambulate.  Code Status: No CPR- Do NOT Intubate  Disposition: Expected discharge in 1-2 days pending safe discharge plan in place    Discussed with patient, bedside RN, , care coordinator  Discussed with patient's , daughter at the bedside  >51 minutes spent by me on the date of service doing chart review, history, exam, documentation & further activities per the note.       My Faulkner MD        Interval History:        Patient lying in bed.  Frail-appearing    Some improvement in shortness of breath.  Denies any chest pain or palpitations at this time.  No nausea or vomiting.  No new tingling or numbness.  No abdominal pain.  Tolerating oral diet.  Has been on 3 L oxygen mask, O2 sats dropped to 89%.  Afebrile.  Ambulating out of bed to chair, per report assist of 2 with gait belt and walker.    Patient mental status improving.  Reports would like to discharge home soon.  Reports would not like any further blood draws or IV access placement.    Afebrile.       Physical Exam:        Physical Exam   Temp:  [97.4  F (36.3  C)-98.4  F (36.9  C)] 97.6  F (36.4  C)  Pulse:  [63-78] 63  Resp:  [20-34] 21  BP: (111-142)/(62-76) 136/66  SpO2:  [89 %-98 %] 90 %    Intake/Output Summary (Last 24 hours) at 1/12/2024 1652  Last data filed at 1/12/2024 1530  Gross per 24 hour   Intake 420 ml   Output 850 ml   Net -430 ml       Admission Weight: 76 kg (167 lb 8.8 oz)  Current Weight: 76 kg (167 lb 8.8 oz)    PHYSICAL EXAM  GENERAL: Patient is in no distress.  Awake and can answer simple questions  HEART: Regular rate and rhythm. S1S2. No murmurs  LUNGS: Bilateral decreased breath sounds, no wheezing or crackles  Respirations unlabored  ABDOMEN: Soft, no abdominal tenderness, bowel sounds heard   NEURO: Moving all extremities  EXTREMITIES: 1+ pedal edema.  SKIN: Warm, dry. No rash  PSYCHIATRY Cooperative       Medications:         azithromycin  250 mg Oral Daily    cefdinir  300 mg Oral Daily    miconazole   Topical BID    senna-docusate  1 tablet Oral BID    sodium chloride (PF)  3 mL Intracatheter Q8H    torsemide  10 mg Oral Daily     - MEDICATION INSTRUCTIONS -, acetaminophen **OR** acetaminophen, benzonatate, calcium carbonate, artificial tears, glucose **OR** dextrose **OR** glucagon, guaiFENesin, ipratropium - albuterol 0.5 mg/2.5 mg/3 mL, lidocaine 4%, lidocaine (buffered or not  buffered), - MEDICATION INSTRUCTIONS -, - MEDICATION INSTRUCTIONS -, senna-docusate **OR** senna-docusate, sodium chloride (PF)         Data:      All new lab and imaging data was reviewed.

## 2024-01-15 NOTE — PROGRESS NOTES
[id number]    Orientation: A&Ox4. [place], wears hearing aids.  Vitals/Pain: AVSS on 3 Lpm/oxy mask and NC. Denies pain, nausea, SOB.  Lines/Drains: refused re-insertion of PIV  LS: diminished  GI/: BS normoactive, x0 BM this shift. Pt having adequate urine output throughout shift. Purewick in place.  Labs: Refused lab draws.  Ambulation/Assist: turn and repo with A2; A2 GB and walker  Skin/Wounds: blanchable redness on coccyx, mepilex in place. Yeast rash under breasts, abdominal folds, and groin - Miconazole powder and interdry in place.  Plan: Palliative care consult requested for goals of care.

## 2024-01-15 NOTE — CONSULTS
Orders received for Congestive Heart Failure to assist with disposition and discharge planning.  CHF education to be completed by bedside nurse.  Will continue to follow and assist with discharge planning.     Bobby Gaona [facility]

## 2024-01-16 LAB
BACTERIA BLD CULT: NO GROWTH

## 2024-01-16 PROCEDURE — 120N000013 HC R&B IMCU

## 2024-01-16 PROCEDURE — 250N000013 HC RX MED GY IP 250 OP 250 PS 637: Performed by: HOSPITALIST

## 2024-01-16 PROCEDURE — 250N000013 HC RX MED GY IP 250 OP 250 PS 637

## 2024-01-16 PROCEDURE — 99232 SBSQ HOSP IP/OBS MODERATE 35: CPT | Performed by: HOSPITALIST

## 2024-01-16 RX ADMIN — MICONAZOLE NITRATE: 2 POWDER TOPICAL at 08:44

## 2024-01-16 RX ADMIN — MICONAZOLE NITRATE: 2 POWDER TOPICAL at 20:33

## 2024-01-16 RX ADMIN — CEFDINIR 300 MG: 300 CAPSULE ORAL at 08:43

## 2024-01-16 RX ADMIN — TORSEMIDE 10 MG: 10 TABLET ORAL at 08:43

## 2024-01-16 RX ADMIN — SENNOSIDES AND DOCUSATE SODIUM 1 TABLET: 50; 8.6 TABLET ORAL at 08:43

## 2024-01-16 ASSESSMENT — ACTIVITIES OF DAILY LIVING (ADL)
ADLS_ACUITY_SCORE: 48
ADLS_ACUITY_SCORE: 52
ADLS_ACUITY_SCORE: 48
ADLS_ACUITY_SCORE: 48
ADLS_ACUITY_SCORE: 52
ADLS_ACUITY_SCORE: 48
ADLS_ACUITY_SCORE: 52
ADLS_ACUITY_SCORE: 48
ADLS_ACUITY_SCORE: 52
ADLS_ACUITY_SCORE: 48

## 2024-01-16 NOTE — PLAN OF CARE
Goal Outcome Evaluation:VSS stable. Voiding well via incontinence and purewick. Denies pain. T/R  02 at 3 L NC til midnight then placed on Oxymask due to low sats.

## 2024-01-16 NOTE — PROGRESS NOTES
[facility]  Hospitalist Progress Note   01/16/2024          Assessment and Plan:       Eros Ontiveros is a 89 year old female with history of arthritis admitted on 1/11/2020 for shortness of breath.    Acute hypoxic hypercapnic respiratory failure likely multifactorial from community-acquired pneumonia, heart failure exacerbation, possible sleep apnea, obesity hypoventilation, deconditioning - Improving    Right upper lobe consolidation likely from pneumonia versus underlying mass  Community-acquired pneumonia.   Possible PORFIRIO/OHS.  --Presented with progressively worsening shortness of breath for 3 weeks prior to admission. Per EMS report O2 sats in 60s on room air, was also found to be hypotensive.  Placed on 15 L nonrebreather and brought to the ED.  On admission afebrile.  Bilateral crackles with tachypnea.  --pH 7.26, pCO2 77. WBC 11.9. Lactic acid 1.4.  Procalcitonin 0.37  Influenza A, influenza B, RSV, COVID-19 PCR negative.  --Chest x-ray with dense consolidation at right upper lobe.  Increased consolidation at the left lung base as well.    CT chest -no pulmonary embolism.  Rounded area of consolidation in the right upper lobe measuring about 4.5 cm could represent pneumonia with a possible underlying  mass. Additional areas of groundglass and patchy opacities in the right lung, likely due to pneumonia. Small bilateral pleural effusions.   Blood cultures no growth to date.  -- Was on BiPAP,  Received diuresis with intravenous Lasix and IV antibiotics with which having improvement in hypoxia, symptoms.  Continued to have hypercapnia with pCO2 of 87.   -- On 1/13 mental status improving.  Patient lost IV access, and declining IV placement.  Declined BiPAP, lab draw.  Patient reports would not like any medical intervention, would like to be comfortable and go home.   --See goals of care discussion below.  --On IV azithromycin and IV ceftriaxone from 1/11, switched to oral azithromycin and oral  cefdinir on 1/13.   -- Continue oral  cefdinir for 10 days and oral azithromycin to complete 5-day course.  DuoNebs as needed.  Diuresis as below.  Wean off oxygen as able to.  Aggressive incentive spirometry, encourage ambulation.  Patient and family now opting for comfort focused care.    Comfort focused care.  Goals of care discussion.  -- On 1/13 mental status improving.  Patient lost IV access, and declining IV placement.  Declines BiPAP, lab draw.  Patient reports would not like any medical intervention, would like to be comfortable and go home.  DNR, DNI.  --1/14, 1/15 -I have had at length discussion with patient's , daughters and granddaughter.  Patient's daughter/granddaughter in support of patient's decisions of no IV access, no lab draws and oral antibiotics and plan to transition with comfort care.  --Patient  reports would like patient to get better and stronger and continue medical care and consider rehabilitation despite education and patient clear of issues.   [unsure if patient's  completely able to comprehend ongoing issues].  --Hospice consult requested for input on home hospice to help family with decision making.  --1/16 - patient and family now opting for comfort focused care.  Plan for discharge to LTC with hospice care.  Social work assisting.  Patient's daughter would like to hold hold off narcotics or benzodiazepines at this time as patient appears comfortable.  Continue as needed Tylenol.  Continue as needed Robaxin.  Bowel meds as needed.    Acute exacerbation of heart failure with preserved EF.  Elevated troponin likely from demand ischemia in the setting of heart failure exacerbation, hypoxia.  Severe concentric LVH without LVOT obstruction   Moderate aortic stenosis.  Asymptomatic sinus bradycardia.  Hypertension, chronic lower extremity edema on Lasix  --On admission report of shortness of breath, no chest pain.  proBNP 9857.  EKG normal sinus rhythm.  Troponin  146 > 129 > 125  --Echocardiogram 01/12 with severe concentric left ventricular hypertrophy.  LVEF of 60 to 65%.  Moderate AS consider infiltrative cardiomyopathy.  See report for details.  -- Received diuresis with intravenous Lasix, switch to oral torsemide.  --Cardiology followed, recommend oral torsemide.  Appreciate input.  -- Continue oral torsemide once a day as long as patient able to tolerate orals, urinary output satisfactory.       Acute encephalopathy likely from metabolic, underlying cognitive impairment.  Concern for cognitive impairment at baseline.  Physical deconditioning from medical illness, senile frailty.  Report of generalized weakness, poor intake.  Hard of hearing.  Some forgetfulness.  Lives at home with her .  On 1/13 patient having hearing aids, much more awake.   UA no concerns for infection.  Magnesium 2.3.  CK 42.  -- Address medical issues as listed.  Now opting for comfort focused care.    Acute kidney injury-likely multifactorial.  History of CKD stage 2  Incidental right kidney lesion.  No urinary symptoms.  UA with nitrite negative, leukocyte esterase negative, trace blood.  *On admission Cr 1.28, BUN 41.5. (Cr 0.98  and BUN 23.8 04/2023).  *CT on admission demonstrated an indeterminate lesion of the lower pole of the right kidney measuring 1.4 cm could represent a cyst with proteinaceous or hemorrhagic material or solid mass.  [Findings discussed with patient and family]  ----1/16 - patient and family now opting for comfort focused care.     Hyperchloremic hypernatremia  Sodium 149, Chloride 109.  Decrease torsemide, encourage oral intake.  No further monitoring while on comfort care.    Hypoalbuminemia likely from poor oral intake, dilutional.  Nutrition following, on supplements.    Concern for endometrial malignancy on imaging.  CT on admission with Finding suspicious for endometrial malignancy with nodular and  calcified enhancing areas in the endometrium and  endometrial canal  expanded to 37 mm.  -- Findings discussed with patient's family.  Patient's reports would not like any further workup.    Concern for colitis on imaging.  CT on admission with Possible mild colitis involving the cecum, ascending colon and  transverse colon, either infectious or inflammatory.   -- Patient tolerating oral intake.  No diarrhea.  Abdomen soft.    Elevated AST  AST 87 on admission.  No history of alcohol use.  On chart review has had elevated AST in the past.  --1/16 - patient and family now opting for comfort focused care.     Thrombocytosis likely reactive improved  *.  Improving.  No  Further monitoring while on comfort care.    Macrocytosis   * 01/11.  Vitamin B12 within normal limits.     Recurrent Basal Cell Carcinoma of Right Pinna   *Mohs surgery 05/2023.   - Noted.      Osteoarthritis   *Shoulders, knees and hands. Hx of use of cane and wheelchair while ambulating.   *Limited mobility at baseline.   Discontinued PTA indomethacin given renal disease  Tylenol as needed. As needed Robaxin ordered.     Gout   Discontinue PTA allopurinol.    Orders Placed This Encounter      Regular Diet Adult      DVT Prophylaxis: SCDs, ambulate.  Code Status: No CPR- Do NOT Intubate  Disposition: Expected discharge pending safe discharge plan in place    Discussed with patient, bedside RN, , care coordinator  Discussed with patient's daughter at the bedside  >35 minutes spent by me on the date of service doing chart review, history, exam, documentation & further activities per the note.      My Faulkner MD        Interval History:        Patient lying in bed.  Frail-appearing  Some improvement in shortness of breath.  Denies any chest pain or palpitations at this time.  No nausea or vomiting.  No new tingling or numbness.  No abdominal pain.  Tolerating oral diet.  Has been on 3 L oxygen mask, O2 sats dropped to 89%.  Afebrile.  Ambulating out of bed to chair, per  report assist of 2 with gait belt and walker.       Physical Exam:        Physical Exam   Temp:  [98  F (36.7  C)-98.3  F (36.8  C)] 98  F (36.7  C)  Pulse:  [65-84] 84  Resp:  [18-20] 18  BP: (131-150)/(71-90) 150/90  SpO2:  [92 %-95 %] 92 %    Intake/Output Summary (Last 24 hours) at 1/12/2024 1652  Last data filed at 1/12/2024 1530  Gross per 24 hour   Intake 420 ml   Output 850 ml   Net -430 ml       Admission Weight: 76 kg (167 lb 8.8 oz)  Current Weight: 76 kg (167 lb 8.8 oz)    PHYSICAL EXAM  GENERAL: Patient is in no distress.  Awake and can answer simple questions  LUNGS: Bilateral decreased breath sounds, no wheezing or crackles  Respirations unlabored  NEURO: Moving all extremities  EXTREMITIES: 1+ pedal edema.  SKIN: Warm, dry. No rash  PSYCHIATRY Cooperative       Medications:         cefdinir  300 mg Oral Daily    miconazole   Topical BID    senna-docusate  1 tablet Oral BID    torsemide  10 mg Oral Daily     - MEDICATION INSTRUCTIONS -, acetaminophen **OR** acetaminophen, benzonatate, calcium carbonate, artificial tears, glucose **OR** dextrose **OR** glucagon, guaiFENesin, ipratropium - albuterol 0.5 mg/2.5 mg/3 mL, lidocaine 4%, lidocaine (buffered or not buffered), - MEDICATION INSTRUCTIONS -, - MEDICATION INSTRUCTIONS -, senna-docusate **OR** senna-docusate         Data:      All new lab and imaging data was reviewed.

## 2024-01-16 NOTE — PROGRESS NOTES
Care Management Follow Up    Length of Stay (days): 5    Expected Discharge Date: 01/17/2024     Concerns to be Addressed: discharge planning    Patient plan of care discussed at interdisciplinary rounds: Yes    Anticipated Discharge Disposition: Skilled Nursing Facility, Hospice     Anticipated Discharge Services: Transportation Services  Anticipated Discharge DME: None    Patient/family educated on Medicare website which has current facility and service quality ratings: yes  Education Provided on the Discharge Plan: Yes  Patient/Family in Agreement with the Plan: yes    Referrals Placed by CM/SW: Post Acute Facilities  Private pay costs discussed: Not applicable    Additional Information:  [facility]. SW called and spoke with pt's granddaughter, Theresa. Will send additional referrals to facilities [facility]. Per Theresa, pt's spouse will also need placement as they want to be together. Family aware case management focus is on placing this pt. Theresa also interested in a facility called [facility] in [place]. She will call and determine bed availability and let SW know if info should be sent. SW following.     SEYMOUR Bird, LICSW  [phone] Desk phone  [phone] Cell/text (Preferred)  [facility]

## 2024-01-17 PROCEDURE — 120N000013 HC R&B IMCU

## 2024-01-17 PROCEDURE — 250N000013 HC RX MED GY IP 250 OP 250 PS 637: Performed by: HOSPITALIST

## 2024-01-17 PROCEDURE — 99232 SBSQ HOSP IP/OBS MODERATE 35: CPT | Performed by: HOSPITALIST

## 2024-01-17 PROCEDURE — 250N000013 HC RX MED GY IP 250 OP 250 PS 637

## 2024-01-17 RX ADMIN — SENNOSIDES AND DOCUSATE SODIUM 1 TABLET: 50; 8.6 TABLET ORAL at 08:42

## 2024-01-17 RX ADMIN — MICONAZOLE NITRATE: 2 POWDER TOPICAL at 08:43

## 2024-01-17 RX ADMIN — TORSEMIDE 10 MG: 10 TABLET ORAL at 08:42

## 2024-01-17 RX ADMIN — MICONAZOLE NITRATE: 2 POWDER TOPICAL at 20:00

## 2024-01-17 RX ADMIN — CEFDINIR 300 MG: 300 CAPSULE ORAL at 08:43

## 2024-01-17 RX ADMIN — SENNOSIDES AND DOCUSATE SODIUM 1 TABLET: 50; 8.6 TABLET ORAL at 20:04

## 2024-01-17 ASSESSMENT — ACTIVITIES OF DAILY LIVING (ADL)
ADLS_ACUITY_SCORE: 52
ADLS_ACUITY_SCORE: 48
ADLS_ACUITY_SCORE: 52

## 2024-01-17 NOTE — PROGRESS NOTES
"[id number]  Orientations: A&O x4. [place], bilateral hearing aides.  Vitals/Pain: VSS on 5 L NC. Oxy mask 3 L overnight. Denies pain.   Tele: n/a  Lines/Drains: No PIV- refuses placement. Purewick in place.   Skin/Wounds: Redness to abdominal folds/breast folds  GI/: incontinent bowel and bladder  Labs: Abnormal/Trends, Electrolyte Replacement- n/a  Ambulation/Assist: Q2 repos, refuses out of bed activity  Plan: pending tcu placement. Comfort based cares.  in room and expresses frustration with \"no one coming into the room or checking on Lenell\" while he has been here. This nurse plus other staff (Social Work, Hospitalist, etc.) have been in room consistently discussing plan of care with patient and .     "

## 2024-01-17 NOTE — PLAN OF CARE
Speech Language Therapy Discharge Summary    Reason for therapy discharge:    Change in medical status.    Progress towards therapy goal(s). See goals on Care Plan in [facility] electronic health record for goal details.  Change to comfort care    Therapy recommendation(s):    No further therapy is recommended.

## 2024-01-17 NOTE — PROGRESS NOTES
19:00-23:00    Orientations: Alert and oriented x 4  Vitals/Pain: Vital signs stable,Oxy mask at 3 LPM while asleep.Denies Pain.  Diet: Regular  Lungs: Clear  Lines/Drains: None  Skin/Wounds: Redness on sacral,Right Skin tear.  GI/: No BM on this shift.External catheter in placed.  Labs: Abnormal/Trends, Electrolyte Replacement- None  Ambulation/Assist: Assist 1 -2,GB,Walker  Sleep Quality: Good  Plan: Family opted to Comfort Focused care.Plan to discharge to LTC with Hospice care.

## 2024-01-17 NOTE — PLAN OF CARE
Goal Outcome Evaluation:      Date/Time: 2300-7:30    Medical    Diagnosis: Acute hypoxic, hypercapnic, respiratory failure, likely multifactorial from community-acquired pneumonia, heart failure exacerbation, possible sleep apnea, obesity hypoventilation, deconditioning - Improving    Right upper lobe consolidation likely from pneumonia versus underlying mass  Community-acquired pneumonia.   Possible PORFIRIO/OHS.    Mental Status: A&Ox4  Activity/dangle: Assist of 2, (Not OOB yet)  Diet: Regular / Snacks/Supplements Adult: Magic Cup; Between Meals  Pain: Denies  [place]/Voiding: Extender cath  Tele/Restraints/Iso: NA  02/LDA: Oxy-mask at 3 liters  D/C Date: 01/17    Other Info: Possible discharge to Skilled nursing facility, No IV access, bilateral edema of lower extremities, especially RLE edema, family considering hospice care at home, weight shift and repositioned extender cath draining well (425). Will continue to monitor.

## 2024-01-17 NOTE — PLAN OF CARE
Occupational Therapy Discharge Summary    Reason for therapy discharge:    Change in medical status.    Progress towards therapy goal(s). See goals on Care Plan in [facility] electronic health record for goal details.  Pt transitioned to comfort care approach.    Therapy recommendation(s):    No further therapy is recommended.

## 2024-01-17 NOTE — PROGRESS NOTES
Care Management Follow Up    Length of Stay (days): 6    Expected Discharge Date: 01/18/2024     Concerns to be Addressed: discharge planning    Patient plan of care discussed at interdisciplinary rounds: Yes    Anticipated Discharge Disposition: Skilled Nursing Facility, Hospice     Anticipated Discharge Services: Transportation Services  Anticipated Discharge DME: None    Patient/family educated on Medicare website which has current facility and service quality ratings: yes  Education Provided on the Discharge Plan: Yes  Patient/Family in Agreement with the Plan: other (see comments)     Referrals Placed by CM/SW: Post Acute Facilities  Private pay costs discussed: Not applicable    Additional Information:  No accepting SNF yet. Granddaughter Theresa is working with an assisted living facility[facility] in [place]. They have availability and a nurse will come to the hospital on Thursday or Friday to do an assessment. DARRICK spoke with [facility], [phone] and faxed info to [phone]. Pt's spouse here today and asking why pt can not go home. Attempted to explain pt was not strong enough and in need of hospice care. He did not appear to acknowledge pt was in need of hospice care and kept asking if she still had pneumonia. SW following.     SEYMOUR Bird, LICSW  [phone] Desk phone  [phone] Cell/text (Preferred)  [facility]

## 2024-01-17 NOTE — PLAN OF CARE
Physical Therapy: Orders received. Chart reviewed and discussed with care team.? Physical Therapy not indicated due to pt and family have chosen comfort care based approach with discharge planned to LTC with hospice.? Defer discharge recommendations to medical team.? Will complete orders.

## 2024-01-17 NOTE — PROGRESS NOTES
[facility]  Hospitalist Progress Note   01/17/2024          Assessment and Plan:       Eros Ontiveros is a 89 year old female with history of arthritis admitted on 1/11/2020 for shortness of breath.    Acute hypoxic hypercapnic respiratory failure likely multifactorial from community-acquired pneumonia, heart failure exacerbation, possible sleep apnea, obesity hypoventilation, deconditioning - Improving    Right upper lobe consolidation likely from pneumonia versus underlying mass  Community-acquired pneumonia.   Possible PORFIRIO/OHS.  --Presented with progressively worsening shortness of breath for 3 weeks prior to admission. Per EMS report O2 sats in 60s on room air, was also found to be hypotensive.  Placed on 15 L nonrebreather and brought to the ED.  On admission afebrile.  Bilateral crackles with tachypnea.  --pH 7.26, pCO2 77. WBC 11.9. Lactic acid 1.4.  Procalcitonin 0.37  Influenza A, influenza B, RSV, COVID-19 PCR negative.  --Chest x-ray with dense consolidation at right upper lobe.  Increased consolidation at the left lung base as well.    CT chest -no pulmonary embolism.  Rounded area of consolidation in the right upper lobe measuring about 4.5 cm could represent pneumonia with a possible underlying  mass. Additional areas of groundglass and patchy opacities in the right lung, likely due to pneumonia. Small bilateral pleural effusions.   Blood cultures no growth to date.  -- Was on BiPAP,  Received diuresis with intravenous Lasix and IV antibiotics with which having improvement in hypoxia, symptoms.  Continued to have hypercapnia with pCO2 of 87.   -- On 1/13 mental status improving.  Patient lost IV access, and declining IV placement.  Declined BiPAP, lab draw.  Patient reports would not like any medical intervention, would like to be comfortable and go home.   --See goals of care discussion below.  --On IV azithromycin and IV ceftriaxone from 1/11, switched to oral azithromycin and oral  No jaundice, no spider angiomata, no skin lesion cefdinir on 1/13.   Completed 5 days of oral azithromycin.  Continue oral cefdinir to complete total 10 days of antibiotics.  DuoNebs as needed.  Diuresis as below.  Wean off oxygen as able to.  Aggressive incentive spirometry, encourage ambulation.  Patient and family now opting for comfort focused care.    Comfort focused care.  Goals of care discussion.  --Admitted on 1/11 to [facility], was on BiPAP.   -- On 1/13 mental status improving, persistent hypercapnia. Patient lost IV access, and declining IV placement.  Declines BiPAP, lab draw.  Patient reports would not like any medical intervention, would like to be comfortable and go home.  DNR, DNI.  --1/14, 1/15 - I have had at length discussion with patient's , daughters and granddaughter.  Patient's daughter/granddaughter in support of patient's decisions of no IV access, no lab draws and oral antibiotics and plan to transition with comfort care. Patient  reports would like patient to get better and stronger and continue medical care and consider rehabilitation despite education and patient clear of issues.   [unsure if patient's  completely able to comprehend ongoing issues].  --Hospice consult requested for input on home hospice to help family with decision making.  --1/16 - patient and family now opting for comfort focused care.  Plan for discharge to LTC with hospice care.   Patient's daughter would like to hold hold off narcotics or benzodiazepines at this time as patient appears comfortable.  1/17 -discussed with , awaiting placement  Continue as needed Tylenol.  Continue as needed Robaxin.  Bowel meds as needed.    Acute exacerbation of heart failure with preserved EF.  Elevated troponin likely from demand ischemia in the setting of heart failure exacerbation, hypoxia.  Severe concentric LVH without LVOT obstruction   Moderate aortic stenosis.  Asymptomatic sinus bradycardia.  Hypertension, chronic lower extremity edema on  Lasix  --On admission report of shortness of breath, no chest pain.  proBNP 9857.  EKG normal sinus rhythm.  Troponin 146 > 129 > 125  --Echocardiogram 01/12 with severe concentric left ventricular hypertrophy.  LVEF of 60 to 65%.  Moderate AS consider infiltrative cardiomyopathy.  See report for details.  -- Received diuresis with intravenous Lasix, switch to oral torsemide on 1/13.  --Cardiology followed, recommend oral torsemide.  Appreciate input.  -- Continue oral torsemide once a day as long as patient able to tolerate orals, urinary output satisfactory.       Acute encephalopathy likely from metabolic, underlying cognitive impairment.  Concern for cognitive impairment at baseline.  Physical deconditioning from medical illness, senile frailty.  Report of generalized weakness, poor intake.  Hard of hearing.  Some forgetfulness.  Lives at home with her .  On 1/13 patient having hearing aids, much more awake.   UA no concerns for infection.  Magnesium 2.3.  CK 42.  -- Address medical issues as listed.  Now opting for comfort focused care.    Acute kidney injury-likely multifactorial.  History of CKD stage 2  Incidental right kidney lesion.  No urinary symptoms.  UA with nitrite negative, leukocyte esterase negative, trace blood.  *On admission Cr 1.28, BUN 41.5. (Cr 0.98  and BUN 23.8 04/2023).  *CT on admission demonstrated an indeterminate lesion of the lower pole of the right kidney measuring 1.4 cm could represent a cyst with proteinaceous or hemorrhagic material or solid mass.  [Findings discussed with patient and family]  ----Patient and family now opting for comfort focused care.     Hyperchloremic hypernatremia  Sodium 149, Chloride 109.  Decrease torsemide, encourage oral intake.  No further monitoring per patient's wishes    Hypoalbuminemia likely from poor oral intake, dilutional.  Nutrition followed, on supplements.    Concern for endometrial malignancy on imaging.  CT on admission with Finding  suspicious for endometrial malignancy with nodular and  calcified enhancing areas in the endometrium and endometrial canal  expanded to 37 mm.  -- Findings discussed with patient's family.  No further workup per patient's wishes    Concern for colitis on imaging.  CT on admission with Possible mild colitis involving the cecum, ascending colon and  transverse colon, either infectious or inflammatory.   -- Patient tolerating oral intake.  No diarrhea.  Abdomen soft.    Elevated AST  AST 87 on admission.  No history of alcohol use.  On chart review has had elevated AST in the past.  No further monitoring per patient's wishes    Thrombocytosis likely reactive improved  *.  Improving.  No further monitoring while on comfort care.    Macrocytosis   * 01/11.  Vitamin B12 within normal limits.     Recurrent Basal Cell Carcinoma of Right Pinna   *Mohs surgery 05/2023.   - Noted.      Osteoarthritis   *Shoulders, knees and hands. Hx of use of cane and wheelchair while ambulating.   *Limited mobility at baseline.   Discontinued PTA indomethacin given renal disease  Tylenol as needed. As needed Robaxin ordered.     Gout   Discontinue PTA allopurinol.    Orders Placed This Encounter      Regular Diet Adult      DVT Prophylaxis: SCDs, ambulate.  Code Status: No CPR- Do NOT Intubate  Disposition: Expected discharge pending safe discharge plan in place    Discussed with patient, bedside RN, , care coordinator  Discussed with patient's daughter 1/16, updated patient's  by the bedside on 1/17-multiple questions were answered  >25 minutes spent by me on the date of service doing chart review, history, exam, documentation & further activities per the note.      My Faulkner MD        Interval History:        Patient lying in bed.  Frail-appearing  Some improvement in shortness of breath.  Denies any chest pain or palpitations at this time.  No nausea or vomiting.  No new tingling or numbness.  No  abdominal pain.  Tolerating oral diet.  Has been on 3 L oxygen mask, O2 sats dropped to 89%.  Afebrile.  Ambulating out of bed to chair, per report assist of 2 with gait belt and walker.  Patient's  by the bedside, multiple questions answered.         Physical Exam:        Physical Exam   Temp:  [97.6  F (36.4  C)-98.3  F (36.8  C)] 97.6  F (36.4  C)  Pulse:  [55-82] 67  Resp:  [16] 16  BP: (117-142)/(54-65) 142/61  SpO2:  [93 %-98 %] 93 %    Intake/Output Summary (Last 24 hours) at 1/12/2024 1652  Last data filed at 1/12/2024 1530  Gross per 24 hour   Intake 420 ml   Output 850 ml   Net -430 ml       Admission Weight: 76 kg (167 lb 8.8 oz)  Current Weight: 76 kg (167 lb 8.8 oz)    PHYSICAL EXAM  GENERAL: Patient is in no distress.  Awake and can answer simple questions  LUNGS: Bilateral decreased breath sounds, no wheezing or crackles  Respirations unlabored  NEURO: Moving all extremities  EXTREMITIES: 1+ pedal edema.  SKIN: Warm, dry. No rash  PSYCHIATRY Cooperative       Medications:         cefdinir  300 mg Oral Daily    miconazole   Topical BID    senna-docusate  1 tablet Oral BID    torsemide  10 mg Oral Daily     - MEDICATION INSTRUCTIONS -, acetaminophen **OR** acetaminophen, benzonatate, calcium carbonate, artificial tears, glucose **OR** dextrose **OR** glucagon, guaiFENesin, ipratropium - albuterol 0.5 mg/2.5 mg/3 mL, lidocaine 4%, lidocaine (buffered or not buffered), - MEDICATION INSTRUCTIONS -, - MEDICATION INSTRUCTIONS -, senna-docusate **OR** senna-docusate         Data:      All new lab and imaging data was reviewed.

## 2024-01-17 NOTE — PLAN OF CARE
Goal Outcome Evaluation:    A&O x 4, [place] bilateral hearing aids present. VSS on 3L NC. Assist x1-2 gb/walker, up in chair for most of day, family refusing repositioning at times. Regular diet, tolerating well. No PIV access providers aware. Denies pain/nausea/SOB. Adequate UOP. No BM during shift. Continue plan of care.

## 2024-01-17 NOTE — PROGRESS NOTES
CLINICAL NUTRITION SERVICES    Per chart review, family now opting for a comfort-care approach w/ plans to discharge w/ hospice cares. No further nutrition interventions planned at this time. RD can be re-consulted, if needed.    RD signing off on 1/17/2024.    Sharonda Chen RD, LD

## 2024-01-18 PROCEDURE — 99232 SBSQ HOSP IP/OBS MODERATE 35: CPT | Performed by: STUDENT IN AN ORGANIZED HEALTH CARE EDUCATION/TRAINING PROGRAM

## 2024-01-18 PROCEDURE — 250N000013 HC RX MED GY IP 250 OP 250 PS 637

## 2024-01-18 PROCEDURE — 250N000013 HC RX MED GY IP 250 OP 250 PS 637: Performed by: HOSPITALIST

## 2024-01-18 PROCEDURE — 120N000013 HC R&B IMCU

## 2024-01-18 RX ADMIN — SENNOSIDES AND DOCUSATE SODIUM 1 TABLET: 50; 8.6 TABLET ORAL at 09:00

## 2024-01-18 RX ADMIN — MICONAZOLE NITRATE: 2 POWDER TOPICAL at 20:37

## 2024-01-18 RX ADMIN — TORSEMIDE 10 MG: 10 TABLET ORAL at 09:01

## 2024-01-18 RX ADMIN — CEFDINIR 300 MG: 300 CAPSULE ORAL at 09:00

## 2024-01-18 RX ADMIN — MICONAZOLE NITRATE: 2 POWDER TOPICAL at 09:04

## 2024-01-18 ASSESSMENT — ACTIVITIES OF DAILY LIVING (ADL)
ADLS_ACUITY_SCORE: 48
ADLS_ACUITY_SCORE: 48
ADLS_ACUITY_SCORE: 52
ADLS_ACUITY_SCORE: 52
ADLS_ACUITY_SCORE: 50
ADLS_ACUITY_SCORE: 52
ADLS_ACUITY_SCORE: 50
ADLS_ACUITY_SCORE: 48
ADLS_ACUITY_SCORE: 52
ADLS_ACUITY_SCORE: 52
ADLS_ACUITY_SCORE: 50
ADLS_ACUITY_SCORE: 52

## 2024-01-18 NOTE — PROGRESS NOTES
[facility]  Hospitalist Progress Note   01/18/2024          Assessment and Plan:       Eros Ontiveros is a 89 year old female with history of arthritis admitted on 1/11/2020 for shortness of breath.    Acute hypoxic hypercapnic respiratory failure likely multifactorial from community-acquired pneumonia, heart failure exacerbation, possible sleep apnea, obesity hypoventilation, deconditioning - Improving    Right upper lobe consolidation likely from pneumonia versus underlying mass  Community-acquired pneumonia.   Possible PORFIRIO/OHS.  --Presented with progressively worsening shortness of breath for 3 weeks prior to admission. Per EMS report O2 sats in 60s on room air, was also found to be hypotensive.  Placed on 15 L nonrebreather and brought to the ED.  On admission afebrile.  Bilateral crackles with tachypnea.  --pH 7.26, pCO2 77. WBC 11.9. Lactic acid 1.4.  Procalcitonin 0.37  Influenza A, influenza B, RSV, COVID-19 PCR negative.  --Chest x-ray with dense consolidation at right upper lobe.  Increased consolidation at the left lung base as well.    CT chest -no pulmonary embolism.  Rounded area of consolidation in the right upper lobe measuring about 4.5 cm could represent pneumonia with a possible underlying  mass. Additional areas of groundglass and patchy opacities in the right lung, likely due to pneumonia. Small bilateral pleural effusions.   Blood cultures no growth to date.  -- Was on BiPAP,  Received diuresis with intravenous Lasix and IV antibiotics with which having improvement in hypoxia, symptoms.  Continued to have hypercapnia with pCO2 of 87.   -- On 1/13 mental status improving.  Patient lost IV access, and declining IV placement.  Declined BiPAP, lab draw.  Patient reports would not like any medical intervention, would like to be comfortable and go home.   --See goals of care discussion below.  --On IV azithromycin and IV ceftriaxone from 1/11, switched to oral azithromycin and oral  cefdinir on 1/13.   Completed 5 days of oral azithromycin.  Continue oral cefdinir to complete total 10 days of antibiotics.  DuoNebs as needed.  Wean off oxygen as able to.  Aggressive incentive spirometry, encourage ambulation.  Patient and family now opting for comfort focused care.    Comfort focused care.  Goals of care discussion.  --Admitted on 1/11 to [facility], was on BiPAP.   -- On 1/13 mental status improving, persistent hypercapnia. Patient lost IV access, and declining IV placement.  Declines BiPAP, lab draw.  Patient reports would not like any medical intervention, would like to be comfortable and go home.  DNR, DNI.  --1/14, 1/15 - I have had at length discussion with patient's , daughters and granddaughter.  Patient's daughter/granddaughter in support of patient's decisions of no IV access, no lab draws and oral antibiotics and plan to transition with comfort care. Patient  reports would like patient to get better and stronger and continue medical care and consider rehabilitation despite education and patient clear of issues.   [unsure if patient's  completely able to comprehend ongoing issues].  --Hospice consult requested for input on home hospice to help family with decision making.  --1/16 - patient and family now opting for comfort focused care.  Plan for discharge to LTC with hospice care.   Patient's daughter would like to hold hold off narcotics or benzodiazepines at this time as patient appears comfortable.  1/17 -discussed with , awaiting placement  Continue as needed Tylenol.  Continue as needed Robaxin.  Bowel meds as needed.    Acute exacerbation of heart failure with preserved EF.  Elevated troponin likely from demand ischemia in the setting of heart failure exacerbation, hypoxia.  Severe concentric LVH without LVOT obstruction   Moderate aortic stenosis.  Asymptomatic sinus bradycardia.  Hypertension, chronic lower extremity edema on Lasix  --On admission  report of shortness of breath, no chest pain.  proBNP 9857.  EKG normal sinus rhythm.  Troponin 146 > 129 > 125  --Echocardiogram 01/12 with severe concentric left ventricular hypertrophy.  LVEF of 60 to 65%.  Moderate AS consider infiltrative cardiomyopathy.  See report for details.  -- Received diuresis with intravenous Lasix, switch to oral torsemide on 1/13.  --Cardiology followed, recommend oral torsemide.  Appreciate input.  -- Continue oral torsemide once a day as long as patient able to tolerate orals, urinary output satisfactory.       Acute encephalopathy likely from metabolic, underlying cognitive impairment.  Concern for cognitive impairment at baseline.  Physical deconditioning from medical illness, senile frailty.  Report of generalized weakness, poor intake.  Hard of hearing.  Some forgetfulness.  Lives at home with her .  On 1/13 patient having hearing aids, much more awake.   UA no concerns for infection.  Magnesium 2.3.  CK 42.  -- Address medical issues as listed.  Now opting for comfort focused care.    Acute kidney injury-likely multifactorial.  History of CKD stage 2  Incidental right kidney lesion.  No urinary symptoms.  UA with nitrite negative, leukocyte esterase negative, trace blood.  *On admission Cr 1.28, BUN 41.5. (Cr 0.98  and BUN 23.8 04/2023).  *CT on admission demonstrated an indeterminate lesion of the lower pole of the right kidney measuring 1.4 cm could represent a cyst with proteinaceous or hemorrhagic material or solid mass.  [Findings discussed with patient and family]  ----Patient and family now opting for comfort focused care.     Hyperchloremic hypernatremia  Sodium 149, Chloride 109.  Decrease torsemide, encourage oral intake.  No further monitoring per patient's wishes    Hypoalbuminemia likely from poor oral intake, dilutional.  Nutrition followed, on supplements.    Concern for endometrial malignancy on imaging.  CT on admission with Finding suspicious for  endometrial malignancy with nodular and  calcified enhancing areas in the endometrium and endometrial canal  expanded to 37 mm.  -- Findings discussed with patient's family.  No further workup per patient's wishes    Concern for colitis on imaging.  CT on admission with Possible mild colitis involving the cecum, ascending colon and  transverse colon, either infectious or inflammatory.   -- Patient tolerating oral intake.  No diarrhea.  Abdomen soft.    Elevated AST  AST 87 on admission.  No history of alcohol use.  On chart review has had elevated AST in the past.  No further monitoring per patient's wishes    Thrombocytosis likely reactive improved  *.  Improving.  No further monitoring while on comfort care.    Macrocytosis   * 01/11.  Vitamin B12 within normal limits.     Recurrent Basal Cell Carcinoma of Right Pinna   *Mohs surgery 05/2023.   - Noted.      Osteoarthritis   *Shoulders, knees and hands. Hx of use of cane and wheelchair while ambulating.   *Limited mobility at baseline.   Discontinued PTA indomethacin given renal disease  Tylenol as needed. As needed Robaxin ordered.     Gout   Discontinue PTA allopurinol.    Orders Placed This Encounter      Regular Diet Adult      DVT Prophylaxis: SCDs, ambulate.  Code Status: No CPR- Do NOT Intubate  Disposition: Expected discharge pending safe discharge plan in place    Discussed with patient, bedside RN, , care coordinator  Discussed with patient's daughter 1/18  >25 minutes spent by me on the date of service doing chart review, history, exam, documentation & further activities per the note.      Long Mayfield MD        Interval History:        Patient sitting in chair.  Frail-appearing  No significant SOB  Denies any chest pain or palpitations at this time.  No nausea or vomiting.  No new tingling or numbness.  No abdominal pain.  Afebrile.  Daughter at bedside         Physical Exam:        Physical Exam   Temp:  [97.6  F (36.4   C)-98.5  F (36.9  C)] 98  F (36.7  C)  Pulse:  [60-78] 66  Resp:  [16-22] 20  BP: (119-158)/(55-71) 119/55  SpO2:  [91 %-97 %] 92 %    PHYSICAL EXAM  GENERAL: Patient is in no distress.  Awake and can answer simple questions  LUNGS: Bilateral decreased breath sounds, no wheezing or crackles  Respirations unlabored  NEURO: Moving all extremities  EXTREMITIES: 1+ pedal edema.  SKIN: Warm, dry. No rash  PSYCHIATRY Cooperative       Medications:         cefdinir  300 mg Oral Daily    miconazole   Topical BID    senna-docusate  1 tablet Oral BID    torsemide  10 mg Oral Daily     - MEDICATION INSTRUCTIONS -, acetaminophen **OR** acetaminophen, benzonatate, calcium carbonate, artificial tears, glucose **OR** dextrose **OR** glucagon, guaiFENesin, ipratropium - albuterol 0.5 mg/2.5 mg/3 mL, lidocaine 4%, lidocaine (buffered or not buffered), - MEDICATION INSTRUCTIONS -, - MEDICATION INSTRUCTIONS -, senna-docusate **OR** senna-docusate         Data:      All new lab and imaging data was reviewed.

## 2024-01-18 NOTE — PROGRESS NOTES
[id number]    Orientations: alert, forgetful, [place]  Vitals/Pain: denies pain; on 5L of O2 via nc, sating low to mid 90s.  Tele: none  Lines/Drains: no PIV  Skin/Wounds: mepilex on sacrum for prevention  GI/: incontinent, had a large BM  Labs: none  Ambulation/Assist: A1-2 with GB and walker  Plan: looking for LTC with hospice service with

## 2024-01-18 NOTE — PROGRESS NOTES
1900h-0700h    Pt AO x4, forgetful at times, hard of hearing. O2Sat 94% on 5lpm of O2 via NC, titrated to 4lpm, tolerating. Clear bilateral breath sound except diminished in lower lobes. Regular diet, swallow pills whole. Incontinent of B/B. Purewick in place, w/ adequate amount of yellow urine. Abdomen round, soft to palpate, BS normoactive x4. Last BM 01/17, scheduled senna given at bedtime. Pt ambulate w/ assist of 1-2 w/ GB + FWW. Neuro intact. Pitting edema 2+ on both feet w/ palpable pulses. Scattered bruises on UE. R foot 3 & 4th toes scab  L foot 4th toe scab. Denies pain.    No IV access.  Plan to discharge to Skilled nursing facility w/ hospice service.

## 2024-01-19 PROCEDURE — 99232 SBSQ HOSP IP/OBS MODERATE 35: CPT | Performed by: STUDENT IN AN ORGANIZED HEALTH CARE EDUCATION/TRAINING PROGRAM

## 2024-01-19 PROCEDURE — 250N000013 HC RX MED GY IP 250 OP 250 PS 637: Performed by: HOSPITALIST

## 2024-01-19 PROCEDURE — 250N000013 HC RX MED GY IP 250 OP 250 PS 637: Performed by: STUDENT IN AN ORGANIZED HEALTH CARE EDUCATION/TRAINING PROGRAM

## 2024-01-19 PROCEDURE — 250N000013 HC RX MED GY IP 250 OP 250 PS 637

## 2024-01-19 PROCEDURE — 120N000001 HC R&B MED SURG/OB

## 2024-01-19 RX ORDER — POTASSIUM CHLORIDE 1500 MG/1
40 TABLET, EXTENDED RELEASE ORAL ONCE
Status: COMPLETED | OUTPATIENT
Start: 2024-01-19 | End: 2024-01-19

## 2024-01-19 RX ADMIN — CEFDINIR 300 MG: 300 CAPSULE ORAL at 09:40

## 2024-01-19 RX ADMIN — TORSEMIDE 10 MG: 10 TABLET ORAL at 09:40

## 2024-01-19 RX ADMIN — SENNOSIDES AND DOCUSATE SODIUM 1 TABLET: 50; 8.6 TABLET ORAL at 09:40

## 2024-01-19 RX ADMIN — MICONAZOLE NITRATE: 2 POWDER TOPICAL at 09:41

## 2024-01-19 RX ADMIN — MICONAZOLE NITRATE: 2 POWDER TOPICAL at 20:26

## 2024-01-19 RX ADMIN — POTASSIUM CHLORIDE 40 MEQ: 1500 TABLET, EXTENDED RELEASE ORAL at 16:25

## 2024-01-19 ASSESSMENT — ACTIVITIES OF DAILY LIVING (ADL)
ADLS_ACUITY_SCORE: 44
ADLS_ACUITY_SCORE: 50
ADLS_ACUITY_SCORE: 46
ADLS_ACUITY_SCORE: 46
ADLS_ACUITY_SCORE: 50
ADLS_ACUITY_SCORE: 46
ADLS_ACUITY_SCORE: 46
ADLS_ACUITY_SCORE: 50

## 2024-01-19 NOTE — PROGRESS NOTES
Care Management Follow Up    Length of Stay (days): 8    Expected Discharge Date: 01/22/2024     Concerns to be Addressed: discharge planning    Patient plan of care discussed at interdisciplinary rounds: Yes    Anticipated Discharge Disposition: Skilled Nursing Facility, Hospice     Anticipated Discharge Services: Transportation Services  Anticipated Discharge DME: None    Patient/family educated on Medicare website which has current facility and service quality ratings: yes  Education Provided on the Discharge Plan: Yes  Patient/Family in Agreement with the Plan:     Referrals Placed by CM/SW: Post Acute Facilities  Private pay costs discussed: transportation costs    Additional Information:  [facility] can accept pt on Monday, [address] [place] [address]. Confirmed with Allison RN, [phone]. SW spoke with grand daughter Theresa. Pt will need transportation. SW explained pt will be billed for the cost of the wheelchair ride. Will plan on 1100. Discussed with Theresa that pt's spouse continues to ask staff about restorative care, not hospice care as was discussed earlier in the week. Offered to make a hospice referral and family can have an informational meeting next week at [facility] to discuss options. Theresa in agreement. Referral sent to [facility]. [facility] states [facility] with [facility], [facility] and [facility].     SEYMOUR Bird, [facility]  [phone] Desk phone  [phone] Cell/text (Preferred)  [facility]    Addendum: Spoke with Mitra at [facility], [phone]. She will reach out to Theresa on Monday or Tuesday to arrange and informational meeting.

## 2024-01-19 NOTE — PROGRESS NOTES
Patient is A/O x 3 with some forgetfulness, vss, a-febrile, denies pain or shortness of breath, LOPEZ, on oxygen at 2 L NC tolerating well, up to the chair with A1 GB and walker, tolerating regular diet, incontinent of B&B, pure wick in place with good output, Plan to discharge to home with Hospice care on Monday.

## 2024-01-19 NOTE — PLAN OF CARE
[id number]:  Orientation: A&Ox3, d/t situation, forgetful, [place].   Vitals/Pain: VSS on 2L humidified NC. Denies pain.   Diet: Regular. Swallows pills whole.   Lungs: LS w/fine crackles to posterior lobes. LOPEZ, denies SOB. Infrequent productive cough.   Lines/Drains: No IV access.   Skin/Wounds: BLE edema. Rash under abd folds. Scattered bruises & scabs. Mepi CDI to coccyx & R upper thigh/gluteal fold.   GI/: Incont of B&B; purewick w/AUO; +BM x2.  Ambulation/Assist: Up A1-2 gb+w to BR, sat in chair.   Plan: Will discharge to SNF w/hospice.

## 2024-01-19 NOTE — PLAN OF CARE
PRIMARY Concern: Community acquired pneumonia/CHF exacerbation.    SAFETY RISK Concerns (fall risk, behaviors, etc.): Fall, Aspiration      Isolation/Type: None  Tests/Procedures for NEXT shift: None  Consults? (Pending/following, signed-off?) SW following for placement and possible hospice outpatient. Cardiology's last note from 1/13 states they are following.   Where is patient from? (Home, TCU, etc.): LTC  Other Important info for NEXT shift: Monitoring till Monday when patient will likely discharge. Patient refusing IV and labs.  Anticipated DC date & active delays: 1/22/24  _____________________________________________________________________________  SUMMARY NOTE:  Orientation/Cognitive: Aox3, disoriented to situation at times.  Observation Goals (Met/ Not Met): Inpatient  Mobility Level/Assist Equipment: A1 GB/Walker  Antibiotics & Plan (IV/po, length of tx left): Oral omnicef   Pain Management: Denies pain this shift   Tele/VS/O2: VSS. 2LPM via nasal cannula. Uses oxymask at night due to mouth breathing   ABNL Lab/BG: No new labs. Previously had elevated PCO2  Diet: Regular. Order for kitchen to help patient order meals.  Bowel/Bladder: Continent; ambulates to bathroom. Historically uses purewick.  Skin Concerns: Skin tear to right inner buttock. Rash in abdominal folds  Drains/Devices: None  Patient Stated Goal for Today: Rest.

## 2024-01-19 NOTE — PROGRESS NOTES
[facility]  Hospitalist Progress Note     01/19/2024          Assessment and Plan:       Eros Ontiveros is a 89 year old female with history of arthritis admitted on 1/11/2020 for shortness of breath.    Acute hypoxic hypercapnic respiratory failure likely multifactorial from community-acquired pneumonia, heart failure exacerbation, possible sleep apnea, obesity hypoventilation, deconditioning - Improving    Right upper lobe consolidation likely from pneumonia versus underlying mass  Community-acquired pneumonia.   Possible PORFIRIO/OHS.  --Presented with progressively worsening shortness of breath for 3 weeks prior to admission. Per EMS report O2 sats in 60s on room air, was also found to be hypotensive.  Placed on 15 L nonrebreather and brought to the ED.  On admission afebrile.  Bilateral crackles with tachypnea.  --pH 7.26, pCO2 77. WBC 11.9. Lactic acid 1.4.  Procalcitonin 0.37  Influenza A, influenza B, RSV, COVID-19 PCR negative.  --Chest x-ray with dense consolidation at right upper lobe.  Increased consolidation at the left lung base as well.    CT chest -no pulmonary embolism.  Rounded area of consolidation in the right upper lobe measuring about 4.5 cm could represent pneumonia with a possible underlying  mass. Additional areas of groundglass and patchy opacities in the right lung, likely due to pneumonia. Small bilateral pleural effusions.   Blood cultures no growth to date.  -- Was on BiPAP,  Received diuresis with intravenous Lasix and IV antibiotics with which having improvement in hypoxia, symptoms.  Continued to have hypercapnia with pCO2 of 87.   -- On 1/13 mental status improving.  Patient lost IV access, and declining IV placement.  Declined BiPAP, lab draw.  Patient reports would not like any medical intervention, would like to be comfortable and go home.   --See goals of care discussion below.  --On IV azithromycin and IV ceftriaxone from 1/11, switched to oral azithromycin and oral  cefdinir on 1/13.   Completed 5 days of oral azithromycin.  Continue oral cefdinir to complete total 10 days of antibiotics.  DuoNebs as needed.  Wean off oxygen as able to.  Aggressive incentive spirometry, encourage ambulation.  Patient and family now opting for comfort focused care.    Comfort focused care.  Goals of care discussion.  --Admitted on 1/11 to [facility], was on BiPAP.   -- On 1/13 mental status improving, persistent hypercapnia. Patient lost IV access, and declining IV placement.  Declines BiPAP, lab draw.  Patient reports would not like any medical intervention, would like to be comfortable and go home.  DNR, DNI.  --1/14, 1/15 - I have had at length discussion with patient's , daughters and granddaughter.  Patient's daughter/granddaughter in support of patient's decisions of no IV access, no lab draws and oral antibiotics and plan to transition with comfort care. Patient  reports would like patient to get better and stronger and continue medical care and consider rehabilitation despite education and patient clear of issues.   [unsure if patient's  completely able to comprehend ongoing issues].  --Hospice consult requested for input on home hospice to help family with decision making.  --1/16 - patient and family now opting for comfort focused care.  Plan for discharge to LTC with hospice care.   Patient's daughter would like to hold hold off narcotics or benzodiazepines at this time as patient appears comfortable.  1/17 -discussed with , awaiting placement  1/19 --Patient and family initially opting for comfort focused care but now  would like restorative cares -> remains DNR / DNI  Continue as needed Tylenol.  Continue as needed Robaxin.  Bowel meds as needed.    Acute exacerbation of heart failure with preserved EF.  Elevated troponin likely from demand ischemia in the setting of heart failure exacerbation, hypoxia.  Severe concentric LVH without LVOT  obstruction   Moderate aortic stenosis.  Asymptomatic sinus bradycardia.  Hypertension, chronic lower extremity edema on Lasix  --On admission report of shortness of breath, no chest pain.  proBNP 9857.  EKG normal sinus rhythm.  Troponin 146 > 129 > 125  --Echocardiogram 01/12 with severe concentric left ventricular hypertrophy.  LVEF of 60 to 65%.  Moderate AS consider infiltrative cardiomyopathy.  See report for details.  --Received diuresis with intravenous Lasix, switch to oral torsemide on 1/13.  --Cardiology followed, recommend oral torsemide.  Appreciate input.  --Continue oral torsemide once a day as long as patient able to tolerate orals, urinary output satisfactory.       Acute encephalopathy likely from metabolic, underlying cognitive impairment.  Concern for cognitive impairment at baseline.  Physical deconditioning from medical illness, senile frailty.  Report of generalized weakness, poor intake.  Hard of hearing.  Some forgetfulness.  Lives at home with her .  On 1/13 patient having hearing aids, much more awake.   UA no concerns for infection.  Magnesium 2.3.  CK 42.  -- Address medical issues as listed.  Now opting for comfort focused care.    Acute kidney injury-likely multifactorial.  History of CKD stage 2  Incidental right kidney lesion.  No urinary symptoms.  UA with nitrite negative, leukocyte esterase negative, trace blood.  *On admission Cr 1.28, BUN 41.5. (Cr 0.98  and BUN 23.8 04/2023).  *CT on admission demonstrated an indeterminate lesion of the lower pole of the right kidney measuring 1.4 cm could represent a cyst with proteinaceous or hemorrhagic material or solid mass.  [Findings discussed with patient and family]  --Patient and family initially opting for comfort focused care but now  would like restorative cares -> remains DNR / DNI    Hyperchloremic hypernatremia  Sodium 149, Chloride 109.  Decrease torsemide, encourage oral intake.  No further monitoring per  patient's wishes    Hypoalbuminemia likely from poor oral intake, dilutional.  Nutrition followed, on supplements.    Concern for endometrial malignancy on imaging.  CT on admission with Finding suspicious for endometrial malignancy with nodular and  calcified enhancing areas in the endometrium and endometrial canal  expanded to 37 mm.  -- Findings discussed with patient's family.  No further workup per patient's wishes    Concern for colitis on imaging.  CT on admission with Possible mild colitis involving the cecum, ascending colon and  transverse colon, either infectious or inflammatory.   -- Patient tolerating oral intake.  No diarrhea.  Abdomen soft.    Elevated AST  AST 87 on admission.  No history of alcohol use.  On chart review has had elevated AST in the past.  No further monitoring per patient's wishes    Thrombocytosis likely reactive improved  *.  Improving.  No further monitoring while on comfort care.    Macrocytosis   * 01/11.  Vitamin B12 within normal limits.     Recurrent Basal Cell Carcinoma of Right Pinna   *Mohs surgery 05/2023.   - Noted.      Osteoarthritis   *Shoulders, knees and hands. Hx of use of cane and wheelchair while ambulating.   *Limited mobility at baseline.   Discontinued PTA indomethacin given renal disease  Tylenol as needed. As needed Robaxin ordered.     Gout   Discontinue PTA allopurinol.    Orders Placed This Encounter      Regular Diet Adult      DVT Prophylaxis: SCDs, ambulate.  Code Status: No CPR- Do NOT Intubate  Disposition: Expected discharge to TCU 01/22    Discussed with patient, bedside RN, , care coordinator  Discussed with patient's daughter 1/18  >35 minutes spent by me on the date of service doing chart review, history, exam, documentation & further activities per the note.      Long Mayfield MD        Interval History:        Patient sitting in chair.  Frail-appearing  No significant SOB  Denies any chest pain or palpitations at this  time.  No nausea or vomiting.  No new tingling or numbness.  No abdominal pain.  Afebrile.   at bedside, would like restorative approach and TCU/AL placement         Physical Exam:        Physical Exam   Temp:  [98.2  F (36.8  C)-98.7  F (37.1  C)] 98.2  F (36.8  C)  Pulse:  [71-83] 71  Resp:  [16-21] 16  BP: (128-182)/(51-82) 145/51  SpO2:  [93 %-98 %] 97 %    PHYSICAL EXAM  GENERAL: Patient is in no distress.  Awake and can answer simple questions  LUNGS: Bilateral decreased breath sounds, no wheezing or crackles  Respirations unlabored  NEURO: Moving all extremities  EXTREMITIES: 1+ pedal edema.  SKIN: Warm, dry. No rash  PSYCHIATRY Cooperative       Medications:         cefdinir  300 mg Oral Daily    miconazole   Topical BID    potassium chloride  40 mEq Oral Once    senna-docusate  1 tablet Oral BID    torsemide  10 mg Oral Daily     - MEDICATION INSTRUCTIONS -, acetaminophen **OR** acetaminophen, benzonatate, calcium carbonate, artificial tears, glucose **OR** dextrose **OR** glucagon, guaiFENesin, ipratropium - albuterol 0.5 mg/2.5 mg/3 mL, lidocaine 4%, lidocaine (buffered or not buffered), - MEDICATION INSTRUCTIONS -, - MEDICATION INSTRUCTIONS -, senna-docusate **OR** senna-docusate         Data:      All new lab and imaging data was reviewed.

## 2024-01-19 NOTE — PLAN OF CARE
Goal Outcome Evaluation:      Plan of Care Reviewed With: patient    Overall Patient Progress: no changeOverall Patient Progress: no change    Outcome Evaluation: Pt A&Ox3, 1 person assist, purick inplace voiding normal vital stable Hard of hearing regular diet no BM on shift

## 2024-01-20 PROCEDURE — 99232 SBSQ HOSP IP/OBS MODERATE 35: CPT | Performed by: STUDENT IN AN ORGANIZED HEALTH CARE EDUCATION/TRAINING PROGRAM

## 2024-01-20 PROCEDURE — 120N000001 HC R&B MED SURG/OB

## 2024-01-20 PROCEDURE — 250N000013 HC RX MED GY IP 250 OP 250 PS 637

## 2024-01-20 PROCEDURE — 250N000013 HC RX MED GY IP 250 OP 250 PS 637: Performed by: HOSPITALIST

## 2024-01-20 RX ADMIN — SENNOSIDES AND DOCUSATE SODIUM 1 TABLET: 50; 8.6 TABLET ORAL at 09:19

## 2024-01-20 RX ADMIN — TORSEMIDE 10 MG: 10 TABLET ORAL at 09:19

## 2024-01-20 RX ADMIN — MICONAZOLE NITRATE: 2 POWDER TOPICAL at 22:23

## 2024-01-20 RX ADMIN — CEFDINIR 300 MG: 300 CAPSULE ORAL at 09:19

## 2024-01-20 RX ADMIN — MICONAZOLE NITRATE: 2 POWDER TOPICAL at 09:19

## 2024-01-20 ASSESSMENT — ACTIVITIES OF DAILY LIVING (ADL)
ADLS_ACUITY_SCORE: 48
ADLS_ACUITY_SCORE: 44
ADLS_ACUITY_SCORE: 44
ADLS_ACUITY_SCORE: 48
ADLS_ACUITY_SCORE: 44
ADLS_ACUITY_SCORE: 44
ADLS_ACUITY_SCORE: 48

## 2024-01-20 NOTE — PROGRESS NOTES
Care Management Follow Up    Length of Stay (days): 9    Expected Discharge Date: 01/22/2024     Concerns to be Addressed: discharge planning  Patient cannot discharge home with hospice  Patient plan of care discussed at interdisciplinary rounds: Yes    Anticipated Discharge Disposition: Skilled Nursing Facility, Hospice     Anticipated Discharge Services: Transportation Services  Anticipated Discharge DME: None    Patient/family educated on Medicare website which has current facility and service quality ratings: yes  Education Provided on the Discharge Plan: Yes  Patient/Family in Agreement with the Plan: other (see comments) (Family is wanting hospice, patient's spouse is wanting resortative care.)    Referrals Placed by CM/SW: Post Acute Facilities  Private pay costs discussed: Not applicable    Additional Information:  Writer spoke with charge Rn who states pt's  has questions about discharge plan.    Writer updated by 66  that pt's  called the unit and is asking what SNF pt will attend and if insurance will cover this.   It appears that discussion occurred with social work team yesterday and pt's grand daughter Theresa. From review of previous social workers notes it appears pt's  is maybe not understanding discharge plan.   Writer discussed with supervisor Melinda. Melinda states writer should update pt's grand daughter Theresa and ask that they update pt's  of discharge plan.    Writer spoke with Dr. Mayfield. Writer informed Dr that writer is reviewing previous social work note and it appears plan is for pt to discharge to AL on Monday with no services. Writer is able to see that pt's family is hopeful to have a informational meeting only with hospice to obtain information about wether  this is something they may want to look at in the future.  verifies that this is the plan return to AL with informational hospice meeting for information in event family decides to go  that route in the future.     Writer spoke via phone with pt's grand daughter Theresa. Theresa verifies plan is for discharge Monday to AL with no services. She verifies that hope is for a hospice agency to do an informational meeting for pt/family Monday to obtain information in the event that they decide to go that route in the future. Theresa states that she is not sure what agency was requested for informational meeting. Writer is able to see that previous  listed that she sent referral to [facility] for such meeting. Theresa is hopeful they will call her soon and set up a meeting with her for Monday as she has the day off. Writer will call [facility] hospice and ask them to call Theresa. Theresa agreeable to this. Writer did update Theresa that pt's  is calling Hospital stating he is wanting to know where pt will go and if insurance will cover this. Theresa states that pt's  thinks pt can get some therapy and get better. It appears pt's  is having a difficult time understanding that hospice may be in pt's near future per discussion with Theresa. Theresa states that they keep telling him what the plan is and he understands it. Then he gets to thinking and wondering if maybe therapy would help pt. Theresa states all of family on board with return to AL and hospice informational meeting. They will continue to update pt's . Theresa states no other questions.    Writer called [facility] and spoke with Camille. Camille will plan to have someone reach out to pt's grand daughter Theresa to talk about informational meeting set up time. They will update care management. Camille request a call if update not received by tomorrow from hospice agency about informational meeting time.   Writer updated charge RN.     Audelia Aguirre, BSW  Social Work  [facility]

## 2024-01-20 NOTE — PLAN OF CARE
Goal Outcome Evaluation:    PRIMARY Concern: Community acquired pneumonia/CHF exacerbation.    SAFETY RISK Concerns (fall risk, behaviors, etc.): Fall, Aspiration      Isolation/Type: None  Tests/Procedures for NEXT shift: None  Consults? (Pending/following, signed-off?) SW following  Where is patient from? (Home, TCU, etc.): LTC  Other Important info for NEXT shift: OP hospice to meet with pt at LTC  Anticipated DC date & active delays: [facility] WC ride scheduled for 11 am  _____________________________________________________________________________  SUMMARY NOTE:  Orientation/Cognitive: Aox4, forgetful  Observation Goals (Met/ Not Met): Inpatient  Mobility Level/Assist Equipment: A1 GB/Walker  Antibiotics & Plan (IV/po, length of tx left): Oral omnicef   Pain Management: Denies  Tele/VS/O2: VSS. 2LPM via nasal cannula. Uses oxymask at night due to mouth breathing   ABNL Lab/BG: None this shift  Diet: Regular  Bowel/Bladder: Continent; ambulates to bathroom.   Skin Concerns: Mepilex to sacrum/buttocks, Rash in abdominal folds, micatin powder applied  Drains/Devices: None  Patient Stated Goal for Today: sleep

## 2024-01-20 NOTE — PROGRESS NOTES
[facility]  Hospitalist Progress Note     01/20/2024          Assessment and Plan:       Eros Ontiveros is a 89 year old female with history of arthritis admitted on 1/11/2020 for shortness of breath.    Acute hypoxic hypercapnic respiratory failure likely multifactorial from community-acquired pneumonia, heart failure exacerbation, possible sleep apnea, obesity hypoventilation, deconditioning - Improving    Right upper lobe consolidation likely from pneumonia versus underlying mass  Community-acquired pneumonia.   Possible PORFIRIO/OHS.  --Presented with progressively worsening shortness of breath for 3 weeks prior to admission. Per EMS report O2 sats in 60s on room air, was also found to be hypotensive.  Placed on 15 L nonrebreather and brought to the ED.  On admission afebrile.  Bilateral crackles with tachypnea.  --pH 7.26, pCO2 77. WBC 11.9. Lactic acid 1.4.  Procalcitonin 0.37  Influenza A, influenza B, RSV, COVID-19 PCR negative.  --Chest x-ray with dense consolidation at right upper lobe.  Increased consolidation at the left lung base as well.    CT chest -no pulmonary embolism.  Rounded area of consolidation in the right upper lobe measuring about 4.5 cm could represent pneumonia with a possible underlying  mass. Additional areas of groundglass and patchy opacities in the right lung, likely due to pneumonia. Small bilateral pleural effusions.   Blood cultures no growth to date.  -- Was on BiPAP,  Received diuresis with intravenous Lasix and IV antibiotics with which having improvement in hypoxia, symptoms.  Continued to have hypercapnia with pCO2 of 87.   -- On 1/13 mental status improving.  Patient lost IV access, and declining IV placement.  Declined BiPAP, lab draw.  Patient reports would not like any medical intervention, would like to be comfortable and go home.   --See goals of care discussion below.  --On IV azithromycin and IV ceftriaxone from 1/11, switched to oral azithromycin and oral  cefdinir on 1/13.   Completed 5 days of oral azithromycin.  Continue oral cefdinir to complete total 10 days of antibiotics.  DuoNebs as needed.  Wean off oxygen as able to.  Aggressive incentive spirometry, encourage ambulation.  Patient and family now opting for comfort focused care.    Comfort focused care.  Goals of care discussion.  --[place] on [facility]11 [place] [facility], was on BiPAP.   -- On 1/13 mental status improving, persistent hypercapnia. Patient lost IV access, and declining IV placement.  Declines BiPAP, lab draw.  Patient reports would not like any medical intervention, would like to be comfortable and go home.  DNR, DNI.  --1/14, 1/15 - I have had at length discussion with patient's , daughters and granddaughter.  Patient's daughter/granddaughter in support of patient's decisions of no IV access, no lab draws and oral antibiotics and plan to transition with comfort care. Patient  reports would like patient to get better and stronger and continue medical care and consider rehabilitation despite education and patient clear of issues.   [unsure if patient's  completely able to comprehend ongoing issues].  --Hospice consult requested for input on home hospice to help family with decision making.  --1/16 - patient and family now opting for comfort focused care.  Plan for discharge to LTC with hospice care.   Patient's daughter would like to hold hold off narcotics or benzodiazepines at this time as patient appears comfortable.  1/17 -discussed with , awaiting placement  1/19 --Patient and family initially opting for comfort focused care but now  would like restorative cares -> remains DNR / DNI  Continue as needed Tylenol.  Continue as needed Robaxin.  Bowel meds as needed.    Acute exacerbation of heart failure with preserved EF.  Elevated troponin likely from demand ischemia in the setting of heart failure exacerbation, hypoxia.  Severe concentric LVH without LVOT  obstruction   Moderate aortic stenosis.  Asymptomatic sinus bradycardia.  Hypertension, chronic lower extremity edema on Lasix  --On admission report of shortness of breath, no chest pain.  proBNP 9857.  EKG normal sinus rhythm.  Troponin 146 > 129 > 125  --Echocardiogram 01/12 with severe concentric left ventricular hypertrophy.  LVEF of 60 to 65%.  Moderate AS consider infiltrative cardiomyopathy.  See report for details.  --Received diuresis with intravenous Lasix, switch to oral torsemide on 1/13.  --Cardiology followed, recommend oral torsemide.  Appreciate input.  --Continue oral torsemide once a day as long as patient able to tolerate orals, urinary output satisfactory.       Acute encephalopathy likely from metabolic, underlying cognitive impairment.  Concern for cognitive impairment at baseline.  Physical deconditioning from medical illness, senile frailty.  Report of generalized weakness, poor intake.  Hard of hearing.  Some forgetfulness.  Lives at home with her .  On 1/13 patient having hearing aids, much more awake.   UA no concerns for infection.  Magnesium 2.3.  CK 42.  -- Address medical issues as listed.  Now opting for comfort focused care.    Acute kidney injury-likely multifactorial.  History of CKD stage 2  Incidental right kidney lesion.  No urinary symptoms.  UA with nitrite negative, leukocyte esterase negative, trace blood.  *On admission Cr 1.28, BUN 41.5. (Cr 0.98  and BUN 23.8 04/2023).  *CT on admission demonstrated an indeterminate lesion of the lower pole of the right kidney measuring 1.4 cm could represent a cyst with proteinaceous or hemorrhagic material or solid mass.  [Findings discussed with patient and family]  --Patient and family initially opting for comfort focused care but now  would like restorative cares -> remains DNR / DNI    Hyperchloremic hypernatremia  Sodium 149, Chloride 109.  Decrease torsemide, encourage oral intake.  No further monitoring per  patient's wishes    Hypoalbuminemia likely from poor oral intake, dilutional.  Nutrition followed, on supplements.    Concern for endometrial malignancy on imaging.  CT on admission with Finding suspicious for endometrial malignancy with nodular and  calcified enhancing areas in the endometrium and endometrial canal  expanded to 37 mm.  -- Findings discussed with patient's family.  No further workup per patient's wishes    Concern for colitis on imaging.  CT on admission with Possible mild colitis involving the cecum, ascending colon and  transverse colon, either infectious or inflammatory.   -- Patient tolerating oral intake.  No diarrhea.  Abdomen soft.    Elevated AST  AST 87 on admission.  No history of alcohol use.  On chart review has had elevated AST in the past.  No further monitoring per patient's wishes    Thrombocytosis likely reactive improved  *.  Improving.  No further monitoring while on comfort care.    Macrocytosis   * 01/11.  Vitamin B12 within normal limits.     Recurrent Basal Cell Carcinoma of Right Pinna   *Mohs surgery 05/2023.   - Noted.      Osteoarthritis   *Shoulders, knees and hands. Hx of use of cane and wheelchair while ambulating.   *Limited mobility at baseline.   Discontinued PTA indomethacin given renal disease  Tylenol as needed. As needed Robaxin ordered.     Gout   Discontinue PTA allopurinol.    Orders Placed This Encounter      Regular Diet Adult      DVT Prophylaxis: SCDs, ambulate.  Code Status: No CPR- Do NOT Intubate  Disposition: Expected discharge to TCU 01/22    Discussed with patient, bedside RN, , care coordinator  Discussed with patient's daughter 1/18  >35 minutes spent by me on the date of service doing chart review, history, exam, documentation & further activities per the note.      Long Mayfield MD        Interval History:        No acute events overnight  No significant SOB  Denies any chest pain or palpitations at this time.  No nausea  or vomiting.  No abdominal pain.  Afebrile.   at bedside, would like PT eval         Physical Exam:        Physical Exam   Temp:  [97.6  F (36.4  C)-98  F (36.7  C)] 98  F (36.7  C)  Pulse:  [57-70] 70  Resp:  [18-20] 20  BP: (136-151)/(65-83) 136/71  SpO2:  [91 %-96 %] 95 %    PHYSICAL EXAM  GENERAL: Patient is in no distress.  Awake and can answer simple questions  LUNGS: Bilateral decreased breath sounds, no wheezing or crackles  Respirations unlabored  NEURO: Moving all extremities  EXTREMITIES: 1+ pedal edema.  SKIN: Warm, dry. No rash  PSYCHIATRY Cooperative       Medications:         cefdinir  300 mg Oral Daily    miconazole   Topical BID    senna-docusate  1 tablet Oral BID    torsemide  10 mg Oral Daily     - MEDICATION INSTRUCTIONS -, acetaminophen **OR** acetaminophen, benzonatate, calcium carbonate, artificial tears, glucose **OR** dextrose **OR** glucagon, guaiFENesin, ipratropium - albuterol 0.5 mg/2.5 mg/3 mL, lidocaine 4%, lidocaine (buffered or not buffered), - MEDICATION INSTRUCTIONS -, - MEDICATION INSTRUCTIONS -, senna-docusate **OR** senna-docusate         Data:      All new lab and imaging data was reviewed.

## 2024-01-20 NOTE — PLAN OF CARE
PRIMARY Concern: Community acquired pneumonia/CHF exacerbation.    SAFETY RISK Concerns (fall risk, behaviors, etc.): Fall, Aspiration      Isolation/Type: None  Tests/Procedures for NEXT shift: None  Consults? (Pending/following, signed-off?) SW following  Where is patient from? (Home, TCU, etc.): LTC  Other Important info for NEXT shift: OP hospice to meet with pt at LTC  Anticipated DC date & active delays: [facility]. WC ride scheduled for 11 am  _____________________________________________________________________________  SUMMARY NOTE:  Orientation/Cognitive: Aox4, forgetful  Observation Goals (Met/ Not Met): Inpatient  Mobility Level/Assist Equipment: A1 GB/Walker  Antibiotics & Plan (IV/po, length of tx left): Oral omnicef   Pain Management: Denies  Tele/VS/O2: VSS. 2LPM via nasal cannula. Uses oxymask at night due to mouth breathing   ABNL Lab/BG: None this shift  Diet: Regular  Bowel/Bladder: Continent; ambulates to bathroom.   Skin Concerns: Mepilex to sacrum/buttocks changed, Rash in abdominal folds, micatin powder applied  Drains/Devices: None  Patient Stated Goal for Today: sleep

## 2024-01-21 LAB
CREAT SERPL-MCNC: 0.87 MG/DL (ref 0.51–0.95)
EGFRCR SERPLBLD CKD-EPI 2021: 63 ML/MIN/1.73M2
NT-PROBNP SERPL-MCNC: 1367 PG/ML (ref 0–1800)
POTASSIUM SERPL-SCNC: 4.7 MMOL/L (ref 3.4–5.3)

## 2024-01-21 PROCEDURE — 82565 ASSAY OF CREATININE: CPT | Performed by: HOSPITALIST

## 2024-01-21 PROCEDURE — 83880 ASSAY OF NATRIURETIC PEPTIDE: CPT | Performed by: STUDENT IN AN ORGANIZED HEALTH CARE EDUCATION/TRAINING PROGRAM

## 2024-01-21 PROCEDURE — 250N000013 HC RX MED GY IP 250 OP 250 PS 637

## 2024-01-21 PROCEDURE — 84132 ASSAY OF SERUM POTASSIUM: CPT | Performed by: HOSPITALIST

## 2024-01-21 PROCEDURE — 250N000013 HC RX MED GY IP 250 OP 250 PS 637: Performed by: HOSPITALIST

## 2024-01-21 PROCEDURE — 36415 COLL VENOUS BLD VENIPUNCTURE: CPT | Performed by: HOSPITALIST

## 2024-01-21 PROCEDURE — 120N000001 HC R&B MED SURG/OB

## 2024-01-21 PROCEDURE — 99232 SBSQ HOSP IP/OBS MODERATE 35: CPT | Performed by: STUDENT IN AN ORGANIZED HEALTH CARE EDUCATION/TRAINING PROGRAM

## 2024-01-21 RX ORDER — TORSEMIDE 10 MG/1
10 TABLET ORAL DAILY
Qty: 30 TABLET | Refills: 0 | Status: SHIPPED | OUTPATIENT
Start: 2024-01-22 | End: 2024-01-22

## 2024-01-21 RX ORDER — BENZONATATE 100 MG/1
100 CAPSULE ORAL 3 TIMES DAILY PRN
Qty: 30 CAPSULE | Refills: 0 | Status: SHIPPED | OUTPATIENT
Start: 2024-01-21 | End: 2024-01-22

## 2024-01-21 RX ORDER — CEFDINIR 300 MG/1
300 CAPSULE ORAL EVERY 12 HOURS SCHEDULED
Status: DISCONTINUED | OUTPATIENT
Start: 2024-01-21 | End: 2024-01-22 | Stop reason: HOSPADM

## 2024-01-21 RX ORDER — CEFDINIR 300 MG/1
300 CAPSULE ORAL EVERY 12 HOURS
Qty: 14 CAPSULE | Refills: 0 | Status: SHIPPED | OUTPATIENT
Start: 2024-01-21 | End: 2024-01-22

## 2024-01-21 RX ADMIN — CEFDINIR 300 MG: 300 CAPSULE ORAL at 20:13

## 2024-01-21 RX ADMIN — MICONAZOLE NITRATE: 2 POWDER TOPICAL at 09:22

## 2024-01-21 RX ADMIN — MICONAZOLE NITRATE: 2 POWDER TOPICAL at 21:32

## 2024-01-21 RX ADMIN — TORSEMIDE 10 MG: 10 TABLET ORAL at 09:22

## 2024-01-21 RX ADMIN — SENNOSIDES AND DOCUSATE SODIUM 1 TABLET: 50; 8.6 TABLET ORAL at 20:13

## 2024-01-21 RX ADMIN — CEFDINIR 300 MG: 300 CAPSULE ORAL at 09:22

## 2024-01-21 RX ADMIN — SENNOSIDES AND DOCUSATE SODIUM 1 TABLET: 50; 8.6 TABLET ORAL at 09:22

## 2024-01-21 ASSESSMENT — ACTIVITIES OF DAILY LIVING (ADL)
ADLS_ACUITY_SCORE: 48
ADLS_ACUITY_SCORE: 50
ADLS_ACUITY_SCORE: 46
ADLS_ACUITY_SCORE: 46
ADLS_ACUITY_SCORE: 51
ADLS_ACUITY_SCORE: 48
ADLS_ACUITY_SCORE: 50
ADLS_ACUITY_SCORE: 46
ADLS_ACUITY_SCORE: 48
ADLS_ACUITY_SCORE: 50

## 2024-01-21 NOTE — PROGRESS NOTES
[facility]  Hospitalist Progress Note     01/21/2024          Assessment and Plan:       Eros Ontiveros is a 89 year old female with history of arthritis admitted on 1/11/2020 for shortness of breath.    Acute hypoxic hypercapnic respiratory failure likely multifactorial from community-acquired pneumonia, heart failure exacerbation, possible sleep apnea, obesity hypoventilation, deconditioning - Improving    Right upper lobe consolidation likely from pneumonia versus underlying mass  Community-acquired pneumonia.   Possible PORFIRIO/OHS.  --Presented with progressively worsening shortness of breath for 3 weeks prior to admission. Per EMS report O2 sats in 60s on room air, was also found to be hypotensive.  Placed on 15 L nonrebreather and brought to the ED.  On admission afebrile.  Bilateral crackles with tachypnea.  --pH 7.26, pCO2 77. WBC 11.9. Lactic acid 1.4.  Procalcitonin 0.37  Influenza A, influenza B, RSV, COVID-19 PCR negative.  --Chest x-ray with dense consolidation at right upper lobe.  Increased consolidation at the left lung base as well.    CT chest -no pulmonary embolism.  Rounded area of consolidation in the right upper lobe measuring about 4.5 cm could represent pneumonia with a possible underlying  mass. Additional areas of groundglass and patchy opacities in the right lung, likely due to pneumonia. Small bilateral pleural effusions.   Blood cultures no growth to date.  -- Was on BiPAP,  Received diuresis with intravenous Lasix and IV antibiotics with which having improvement in hypoxia, symptoms.  Continued to have hypercapnia with pCO2 of 87.   -- On 1/13 mental status improving.  Patient lost IV access, and declining IV placement.  Declined BiPAP, lab draw.  Patient reports would not like any medical intervention, would like to be comfortable and go home.   --See goals of care discussion below.  --On IV azithromycin and IV ceftriaxone from 1/11, switched to oral azithromycin and  oral cefdinir on 1/13.   Completed 5 days of oral azithromycin.  Continue oral cefdinir to complete total 10 days of antibiotics.  DuoNebs as needed.  Wean off oxygen as able to.  Aggressive incentive spirometry, encourage ambulation.  Patient and family now opting for comfort focused care.    Comfort focused care.  Goals of care discussion.  --Admitted on 1/11 to [facility], was on BiPAP.   -- On 1/13 mental status improving, persistent hypercapnia. Patient lost IV access, and declining IV placement.  Declines BiPAP, lab draw.  Patient reports would not like any medical intervention, would like to be comfortable and go home.  DNR, DNI.  --1/14, 1/15 - I have had at length discussion with patient's , daughters and granddaughter.  Patient's daughter/granddaughter in support of patient's decisions of no IV access, no lab draws and oral antibiotics and plan to transition with comfort care. Patient  reports would like patient to get better and stronger and continue medical care and consider rehabilitation despite education and patient clear of issues.   [unsure if patient's  completely able to comprehend ongoing issues].  --Hospice consult requested for input on home hospice to help family with decision making.  --1/16 - patient and family now opting for comfort focused care.  Plan for discharge to LTC with hospice care.   Patient's daughter would like to hold hold off narcotics or benzodiazepines at this time as patient appears comfortable.  1/17 -discussed with , awaiting placement  1/19 --Patient and family initially opting for comfort focused care but now  would like restorative cares -> remains DNR / DNI  Continue as needed Tylenol.  Continue as needed Robaxin.  Bowel meds as needed.  Sw consulted -> [facility] on Monday     Acute exacerbation of heart failure with preserved EF.  Elevated troponin likely from demand ischemia in the setting of heart  failure exacerbation, hypoxia.  Severe concentric LVH without LVOT obstruction   Moderate aortic stenosis.  Asymptomatic sinus bradycardia.  Hypertension, chronic lower extremity edema on Lasix  --On admission report of shortness of breath, no chest pain.  proBNP 9857.  EKG normal sinus rhythm.  Troponin 146 > 129 > 125  --Echocardiogram 01/12 with severe concentric left ventricular hypertrophy.  LVEF of 60 to 65%.  Moderate AS consider infiltrative cardiomyopathy.  See report for details.  --Received diuresis with intravenous Lasix, switch to oral torsemide on 1/13.  --Cardiology followed, recommend oral torsemide.  Appreciate input.  --Continue oral torsemide once a day as long as patient able to tolerate orals, urinary output satisfactory.       Acute encephalopathy likely from metabolic, underlying cognitive impairment.  Concern for cognitive impairment at baseline.  Physical deconditioning from medical illness, senile frailty.  Report of generalized weakness, poor intake.  Hard of hearing.  Some forgetfulness.  Lives at home with her .  On 1/13 patient having hearing aids, much more awake.   UA no concerns for infection.  Magnesium 2.3.  CK 42.  -- Address medical issues as listed.  Now opting for comfort focused care.    Acute kidney injury-likely multifactorial.  History of CKD stage 2  Incidental right kidney lesion.  No urinary symptoms.  UA with nitrite negative, leukocyte esterase negative, trace blood.  *On admission Cr 1.28, BUN 41.5. (Cr 0.98  and BUN 23.8 04/2023).  *CT on admission demonstrated an indeterminate lesion of the lower pole of the right kidney measuring 1.4 cm could represent a cyst with proteinaceous or hemorrhagic material or solid mass.  [Findings discussed with patient and family]  --Patient and family initially opting for comfort focused care but now  would like restorative cares -> remains DNR / DNI    Hyperchloremic hypernatremia  Sodium 149, Chloride 109.  Decrease  torsemide, encourage oral intake.  No further monitoring per patient's wishes    Hypoalbuminemia likely from poor oral intake, dilutional.  Nutrition followed, on supplements.    Concern for endometrial malignancy on imaging.  CT on admission with Finding suspicious for endometrial malignancy with nodular and  calcified enhancing areas in the endometrium and endometrial canal  expanded to 37 mm.  -- Findings discussed with patient's family.  No further workup per patient's wishes    Concern for colitis on imaging.  CT on admission with Possible mild colitis involving the cecum, ascending colon and  transverse colon, either infectious or inflammatory.   -- Patient tolerating oral intake.  No diarrhea.  Abdomen soft.    Elevated AST  AST 87 on admission.  No history of alcohol use.  On chart review has had elevated AST in the past.  No further monitoring per patient's wishes    Thrombocytosis likely reactive improved  *.  Improving.  No further monitoring while on comfort care.    Macrocytosis   * 01/11.  Vitamin B12 within normal limits.     Recurrent Basal Cell Carcinoma of Right Pinna   *Mohs surgery 05/2023.   - Noted.      Osteoarthritis   *Shoulders, knees and hands. Hx of use of cane and wheelchair while ambulating.   *Limited mobility at baseline.   Discontinued PTA indomethacin given renal disease  Tylenol as needed. As needed Robaxin ordered.     Gout   Discontinue PTA allopurinol.    Orders Placed This Encounter      Regular Diet Adult      DVT Prophylaxis: SCDs, ambulate.  Code Status: No CPR- Do NOT Intubate  Disposition: Expected discharge to TCU 01/22    Discussed with patient, bedside RN, , care coordinator  >25 minutes spent by me on the date of service doing chart review, history, exam, documentation & further activities per the note.      Long Mayfield MD        Interval History:        No acute events overnight  No significant SOB  Denies any chest pain or palpitations at  this time.  No nausea or vomiting.  No abdominal pain.  Afebrile.   at bedside, would like RN to ambulate patient         Physical Exam:        Physical Exam   Temp:  [98  F (36.7  C)-98.6  F (37  C)] 98.5  F (36.9  C)  Pulse:  [65-82] 82  Resp:  [18] 18  BP: (124-154)/(68-76) 124/76  SpO2:  [93 %-97 %] 97 %    PHYSICAL EXAM  GENERAL: Patient is in no distress.  Awake and can answer simple questions  LUNGS: Bilateral decreased breath sounds, no wheezing or crackles  Respirations unlabored  NEURO: Moving all extremities  EXTREMITIES: 1+ pedal edema.  SKIN: Warm, dry. No rash  PSYCHIATRY Cooperative       Medications:         cefdinir  300 mg Oral Q12H [facility] (08/20)    miconazole   Topical BID    senna-docusate  1 tablet Oral BID    torsemide  10 mg Oral Daily     - MEDICATION INSTRUCTIONS -, acetaminophen **OR** acetaminophen, benzonatate, calcium carbonate, artificial tears, glucose **OR** dextrose **OR** glucagon, guaiFENesin, ipratropium - albuterol 0.5 mg/2.5 mg/3 mL, lidocaine 4%, lidocaine (buffered or not buffered), - MEDICATION INSTRUCTIONS -, - MEDICATION INSTRUCTIONS -, senna-docusate **OR** senna-docusate         Data:      All new lab and imaging data was reviewed.

## 2024-01-21 NOTE — PLAN OF CARE
Goal Outcome Evaluation:    PRIMARY Concern: Community acquired pneumonia/CHF exacerbation.    SAFETY RISK Concerns (fall risk, behaviors, etc.): Fall, Aspiration      Isolation/Type: None  Tests/Procedures for NEXT shift: None  Consults? (Pending/following, signed-off?) SW following  Where is patient from? (Home, TCU, etc.): LTC  Other Important info for NEXT shift: OP hospice to meet with pt at LTC  Anticipated DC date & active delays: [facility]. WC ride scheduled for 11 am  _____________________________________________________________________________  SUMMARY NOTE:  Orientation/Cognitive: AOx4, forgetful  Observation Goals (Met/ Not Met): Inpatient  Mobility Level/Assist Equipment: A1 GB/Walker  Antibiotics & Plan (IV/po, length of tx left): Oral omnicef   Pain Management: Denies pain  Tele/VS/O2: VSS. 2LPM via nasal cannula BL  ABNL Lab/BG: None this shift  Diet: Regular  Bowel/Bladder: Continent; ambulates to bathroom, incontinent at times  Skin Concerns: Mepilex to sacrum/buttocks, Rash in abdominal folds  Drains/Devices: None  Patient Stated Goal for Today: sleep

## 2024-01-21 NOTE — PROGRESS NOTES
PRIMARY Concern:  CHF exacerbation, CAP   SAFETY RISK Concerns (fall risk, behaviors, etc.):  Fall, aspiration      Isolation/Type:  NA  Tests/Procedures for NEXT shift:  AM labs   Consults? (Pending/following, signed-off?)  SW   Where is patient from? (Home, TCU, etc.):  LTC  Other Important info for NEXT shift:  hospice to meet with patient at LTC  Anticipated DC date & active delays:1/22 back to senior living   _____________________________________________________________________________  SUMMARY NOTE:          Orientation/Cognitive: A&Ox4, forgetful   Observation Goals (Met/ Not Met):  Inpatient   Mobility Level/Assist Equipment:  A1 GB/walker  Antibiotics & Plan (IV/po, length of tx left):  PO omnicef   Pain Management:  denies pain   Tele/VS/O2:  VSS, 2L NC which is pt. Baseline   ABNL Lab/BG:  NA  Diet:  reg  Bowel/Bladder:  incontinent at times, ambulates to BR  Skin Concerns:  rash in abd folds, mepalex on right buttock  Drains/Devices: NA  Patient Stated Goal for Today: NA

## 2024-01-21 NOTE — PLAN OF CARE
Physical Therapy: Orders received. Chart reviewed and discussed with care team.? Physical Therapy not indicated due to per discussion with charge RN and care coordinator, pt discharging home to [facility] 1/22 on hospice, therefore does not have discharge planning needs. Nursing can meet pt's daily mobility needs as per previous therapy notes, pt was not tolerating therapy and showed limited progress. Pt does not have skilled IPPT needs at this time.  PT will sign off.? Defer discharge recommendations to medical team.? Will complete orders.

## 2024-01-21 NOTE — PROGRESS NOTES
Care Management Follow Up    Length of Stay (days): 10    Expected Discharge Date: 01/22/2024     Concerns to be Addressed: discharge planning  Patient cannot discharge home with hospice  Patient plan of care discussed at interdisciplinary rounds: Yes    Anticipated Discharge Disposition: Skilled Nursing Facility, Hospice     Anticipated Discharge Services: Transportation Services  Anticipated Discharge DME: None    Patient/family educated on Medicare website which has current facility and service quality ratings: yes  Education Provided on the Discharge Plan: Yes  Patient/Family in Agreement with the Plan: other (see comments) (Family is wanting hospice, patient's spouse is wanting resortative care.)    Referrals Placed by CM/SW: Post Acute Facilities  Private pay costs discussed: Not applicable    Additional Information:  Writer placed phone call to pt's grand daughter Theresa. Theresa states pt's stuff is ready at [place]. Theresa states that she has not received phone call from [facility] yet for informational meeting. Writer will call [facility] again. Theresa would like writer to set up transport as close to 11 tomorrow as possible. She is aware w/c transport is a time period and writer will try best to get in that time period. Theresa states no questions or concerns.     Writer called [facility] hospice and spoke with Mirta. Writer updated mirta that yesterday writer spoke to Camille who had agreed to have someone call pt's grand daughter about option times for informational meeting about hospice. Writer asked Mirta that someone call Theresa right away today about when an informational hospice meeting can occur. Writer informed Harsha that pt will not be discharging on hospice as she will be going home with no service but pt family interested in an informational meeting. Mirta agreeable to have someone reach out to Theresa.    Writer called [facility] with [facility] care senior living at number listed in  note of  1/19 number being [phone]. Writer left generic voicemail asking for call back as does not state it is a confidential voicemail.     Writer set up transport for pt wheelchair with [facility]el Monday [id number] . [facility] wanting to verify pt can manage her own o2 writer will ask charge rn    Writer texted charge rn asking if pt can manage her own o2 as wheelchair will bring it but pt needs to be able to manage it.     Addendum: writer sent dr Mayfield a [employer] message of transport time and needing discharge orders two hours prior.   Dr. Graf asking for home care PT to be added. Writer will ask care coordinator if can assist.   Writer sent text to care coordinator and left in report of doctor request.     Audelia Aguirre, BSW  Social Work  [facility]

## 2024-01-22 ENCOUNTER — HOSPITAL ENCOUNTER (EMERGENCY)
Facility: CLINIC | Age: 89
Discharge: NURSING, REHAB OR LONG-TERM CARE | End: 2024-01-22
Attending: EMERGENCY MEDICINE | Admitting: EMERGENCY MEDICINE
Payer: COMMERCIAL

## 2024-01-22 VITALS
BODY MASS INDEX: 40.6 KG/M2 | HEIGHT: 60 IN | HEART RATE: 79 BPM | OXYGEN SATURATION: 93 % | WEIGHT: 206.79 LBS | DIASTOLIC BLOOD PRESSURE: 65 MMHG | RESPIRATION RATE: 18 BRPM | SYSTOLIC BLOOD PRESSURE: 133 MMHG | TEMPERATURE: 97.9 F

## 2024-01-22 VITALS
BODY MASS INDEX: 41.77 KG/M2 | RESPIRATION RATE: 18 BRPM | DIASTOLIC BLOOD PRESSURE: 67 MMHG | SYSTOLIC BLOOD PRESSURE: 131 MMHG | OXYGEN SATURATION: 99 % | HEIGHT: 59 IN | HEART RATE: 70 BPM | TEMPERATURE: 97.3 F

## 2024-01-22 DIAGNOSIS — R09.02 HYPOXIA: ICD-10-CM

## 2024-01-22 PROCEDURE — 250N000013 HC RX MED GY IP 250 OP 250 PS 637: Performed by: HOSPITALIST

## 2024-01-22 PROCEDURE — 99239 HOSP IP/OBS DSCHRG MGMT >30: CPT | Performed by: STUDENT IN AN ORGANIZED HEALTH CARE EDUCATION/TRAINING PROGRAM

## 2024-01-22 PROCEDURE — 99283 EMERGENCY DEPT VISIT LOW MDM: CPT

## 2024-01-22 RX ORDER — CEFDINIR 300 MG/1
300 CAPSULE ORAL EVERY 12 HOURS
Qty: 14 CAPSULE | Refills: 0 | Status: SHIPPED | OUTPATIENT
Start: 2024-01-22

## 2024-01-22 RX ORDER — BENZONATATE 100 MG/1
100 CAPSULE ORAL 3 TIMES DAILY PRN
Qty: 30 CAPSULE | Refills: 0 | Status: SHIPPED | OUTPATIENT
Start: 2024-01-22

## 2024-01-22 RX ORDER — TORSEMIDE 10 MG/1
10 TABLET ORAL DAILY
Qty: 30 TABLET | Refills: 0 | Status: SHIPPED | OUTPATIENT
Start: 2024-01-22

## 2024-01-22 RX ADMIN — SENNOSIDES AND DOCUSATE SODIUM 1 TABLET: 50; 8.6 TABLET ORAL at 08:44

## 2024-01-22 RX ADMIN — TORSEMIDE 10 MG: 10 TABLET ORAL at 08:44

## 2024-01-22 RX ADMIN — CEFDINIR 300 MG: 300 CAPSULE ORAL at 08:44

## 2024-01-22 RX ADMIN — MICONAZOLE NITRATE: 2 POWDER TOPICAL at 08:46

## 2024-01-22 ASSESSMENT — ACTIVITIES OF DAILY LIVING (ADL)
ADLS_ACUITY_SCORE: 44
ADLS_ACUITY_SCORE: 35
ADLS_ACUITY_SCORE: 35
ADLS_ACUITY_SCORE: 44
ADLS_ACUITY_SCORE: 44
ADLS_ACUITY_SCORE: 46
ADLS_ACUITY_SCORE: 44
ADLS_ACUITY_SCORE: 46

## 2024-01-22 NOTE — PROGRESS NOTES
Patient has been assessed for Home Oxygen needs. Oxygen readings:    *Pulse oximetry (SpO2) = 88% on room air at rest while awake.    *SpO2 improved to 95% on 4 liters/minute at rest.    *SpO2 = 84% on room air during activity/with exercise.    *SpO2 improved to 94% on 5 liters/minute during activity/with exercise.

## 2024-01-22 NOTE — PROGRESS NOTES
Care Management Follow Up    Length of Stay (days): 11    Expected Discharge Date: 01/22/2024     Concerns to be Addressed: discharge planning  Patient cannot discharge home with hospice, home care PT  Patient plan of care discussed at interdisciplinary rounds: Yes    Anticipated Discharge Disposition: [facility] with home care PT with [facility]     Anticipated Discharge Services: Home care PT, Transportation Services  Anticipated Discharge DME: None    Patient/family educated on Medicare website which has current facility and service quality ratings: yes  Education Provided on the Discharge Plan: Yes  Patient/Family in Agreement with the Plan: other (see comments) (Family is wanting hospice, patient's spouse is wanting resortative care.)    Referrals Placed by CM/SW: Post Acute Facilities  Private pay costs discussed: Not applicable    Additional Information:  Request to arrange home care PT per hospitalist, as  wanting restorative care.  Referral sent to [facility] and they accepted for home care PT.  SW updated Allison with [facility] when she confirmed pt can discharge to them today and also updated granddaughter, Theresa, when spoke with her regarding home care PT.      Lyubov Malone RN, BS  Care Coordinator  [email or web address]

## 2024-01-22 NOTE — PROGRESS NOTES
Received phone call at from [facility] that pt arrived without home oxygen being arranged. Pt was not on oxygen prior to admission.  Pt had discharge via stretcher with oxygen.  No oxygen at [facility] for patient and pt on oxygen with transport.    CC called [facility] regarding order for home oxygen and left message with service per process.      Received call back from Alysha at [facility] stating there was no home oxygen assessment charted and this is needed in order to process new home oxygen.    Returned call to [facility] and per [facility], they will have to have pt return to hospital if unable to get oxygen, as pt still on oxygen from stretcher transport.      Received call from pt granddaughter, Theresa Lagos.  Family very upset that patient arrived to facility without oxygen and do not want her to have to return to hospital.    CC spoke with Dr Mayfield and requested order for home oxygen assessment for bedside nurse that had pt prior to discharge to fill out to qualify pt for home oxygen.    CC reached out to Case Management manager and  regarding situation and left message.      Bedside RN completed home oxygen assessment    CC called [facility] and updated that home oxygen assessment now in chart.  [facility] this afternoon before 4:30pm.  Requested to have oxygen delivered as soon as possible, as pt already at facility.  Gave phone number for [facility] [phone] [facility] to let her know when oxygen being delivered     Left message for [facility] that oxygen will be delivered before 4:30pm and [facility] number when on the way to deliver    Called Theresa pt granddaughter, with update regarding oxygen being delivered this afternoon.  Per Theresa, pt on route to being brought back to hospital.      Updated CM manager and SW  supervisor that pt on way to being brought back to hospital and that oxygen will be delivered to [facility] this afternoon before 4:30.  SW supervisor updated ED Care Coordinator that pt on route back to hospital and that oxygen will be delivered to shelter this afternoon to assist with coordinating pt returning to shelter.    Lyubov Malone RN, BS  Care Coordinator  [email or web address]

## 2024-01-22 NOTE — ED NOTES
Called facility to update regarding transport to facility. No answer at #[phone]. Message left.

## 2024-01-22 NOTE — DISCHARGE SUMMARY
[facility]  Hospitalist Discharge Summary      Date of Admission:  1/11/2024  Date of Discharge:  1/22/2024  Discharging Provider: Long Mayfield MD  Discharge Service: Hospitalist Service    Discharge Diagnoses     Acute hypoxic hypercapnic respiratory failure   Acute Diastolic Heart failure exacerbation   Right upper lobe Community-acquired pneumonia.   Possible PORFIRIO/OHS.    Clinically Significant Risk Factors     # Severe Obesity: Estimated body mass index is 40.39 kg/m  as calculated from the following:    Height as of this encounter: 1.524 m (5').    Weight as of this encounter: 93.8 kg (206 lb 12.7 oz).    # Moderate Malnutrition: based on nutrition assessment      Follow-ups Needed After Discharge   Follow-up Appointments     Follow-up and recommended labs and tests       Follow up with primary care provider, Damir Collins, within 7   days for hospital follow- up.  No follow up labs or test are needed.            Unresulted Labs Ordered in the Past 30 Days of this Admission       No orders found from 12/12/2023 to 1/12/2024.          Discharge Disposition   Discharged to assisted living  Condition at discharge: Stable    Hospital Course      Eros Ontiveros is a 89 year old female with history of arthritis admitted on 1/11/2020 for shortness of breath.     Acute hypoxic hypercapnic respiratory failure   Acute Diastolic Heart failure exacerbation   Right upper lobe Community-acquired pneumonia.   Possible PORFIRIO/OHS.    --Presented with progressively worsening shortness of breath for 3 weeks prior to admission. Per EMS report O2 sats in 60s on room air, was also found to be hypotensive.  Placed on 15 L nonrebreather and brought to the ED.  On admission afebrile.  Bilateral crackles with tachypnea.  --pH 7.26, pCO2 77. WBC 11.9. Lactic acid 1.4.  Procalcitonin 0.37  Influenza A, influenza B, RSV, COVID-19 PCR negative.  --Chest x-ray with dense consolidation at right upper lobe.   Increased consolidation at the left lung base as well.    CT chest -no pulmonary embolism.  Rounded area of consolidation in the right upper lobe measuring about 4.5 cm could represent pneumonia with a possible underlying  mass. Additional areas of groundglass and patchy opacities in the right lung, likely due to pneumonia. Small bilateral pleural effusions.   Blood cultures no growth to date.  -- Was on BiPAP,  Received diuresis with intravenous Lasix and IV antibiotics with which having improvement in hypoxia, symptoms.  Continued to have hypercapnia with pCO2 of 87.   -- On 1/13 mental status improving.  Patient lost IV access, and declining IV placement.  Declined BiPAP, lab draw.  Patient reports would not like any medical intervention, would like to be comfortable and go home.   --See goals of care discussion below.  --On IV azithromycin and IV ceftriaxone from 1/11, switched to oral azithromycin and oral cefdinir on 1/13.   Completed 5 days of oral azithromycin.  Continue oral cefdinir at discharge  Wean off oxygen as able to.     Goals of care discussion.  --Admitted on 1/11 to [facility], was on BiPAP.   -- On 1/13 mental status improving, persistent hypercapnia. Patient lost IV access, and declining IV placement.  Declines BiPAP, lab draw.  Patient reports would not like any medical intervention, would like to be comfortable and go home.  DNR, DNI.  --1/14, 1/15 - I have had at length discussion with patient's , daughters and granddaughter.  Patient's daughter/granddaughter in support of patient's decisions of no IV access, no lab draws and oral antibiotics and plan to transition with comfort care. Patient  reports would like patient to get better and stronger and continue medical care and consider rehabilitation despite education and patient clear of issues.   [unsure if patient's  completely able to comprehend ongoing issues].  --Hospice consult requested for input on home hospice to help  family with decision making.  --1/16 - patient and family now opting for comfort focused care.  Plan for discharge to LTC with hospice care.   Patient's daughter would like to hold hold off narcotics or benzodiazepines at this time as patient appears comfortable.  1/17 -discussed with , awaiting placement  1/19 --Patient and family initially opting for comfort focused care but now  would like restorative cares -> remains DNR / DNI  Continue as needed Tylenol.  Sw consulted -> [facility] can accept pt on Monday      Acute exacerbation of heart failure with preserved EF.  Elevated troponin likely from demand ischemia in the setting of heart failure exacerbation, hypoxia.  Severe concentric LVH without LVOT obstruction   Moderate aortic stenosis.  Asymptomatic sinus bradycardia.  Hypertension, chronic lower extremity edema on Lasix  --On admission report of shortness of breath, no chest pain.  proBNP 9857.  EKG normal sinus rhythm.  Troponin 146 > 129 > 125  --Echocardiogram 01/12 with severe concentric left ventricular hypertrophy.  LVEF of 60 to 65%.  Moderate AS consider infiltrative cardiomyopathy.  See report for details.  --Received diuresis with intravenous Lasix, switch to oral torsemide on 1/13.  --Cardiology followed, recommend oral torsemide.  Appreciate input.  --Continue oral torsemide once a day as long as patient able to tolerate orals, urinary output satisfactory.         Acute encephalopathy likely from metabolic, underlying cognitive impairment.  Concern for cognitive impairment at baseline.  Physical deconditioning from medical illness, senile frailty.  Report of generalized weakness, poor intake.  Hard of hearing.  Some forgetfulness.  Lives at home with her .  On 1/13 patient having hearing aids, much more awake.   UA no concerns for infection.  Magnesium 2.3.  CK 42.  -- Address medical issues as listed.     Acute kidney injury-likely  multifactorial.  History of CKD stage 2  Incidental right kidney lesion.  No urinary symptoms.  UA with nitrite negative, leukocyte esterase negative, trace blood.  *On admission Cr 1.28, BUN 41.5. (Cr 0.98  and BUN 23.8 04/2023).  *CT on admission demonstrated an indeterminate lesion of the lower pole of the right kidney measuring 1.4 cm could represent a cyst with proteinaceous or hemorrhagic material or solid mass.  [Findings discussed with patient and family]  --Patient and family initially opting for comfort focused care but now  would like restorative cares -> remains DNR / DNI     Hyperchloremic hypernatremia  Sodium 149, Chloride 109.  Decrease torsemide, encourage oral intake.    Hypoalbuminemia likely from poor oral intake, dilutional.  Nutrition followed, on supplements.     Concern for endometrial malignancy on imaging.  CT on admission with Finding suspicious for endometrial malignancy with nodular and  calcified enhancing areas in the endometrium and endometrial canal  expanded to 37 mm.  -- Findings discussed with patient's family.  No further workup per patient's wishes     Concern for colitis on imaging.  CT on admission with Possible mild colitis involving the cecum, ascending colon and  transverse colon, either infectious or inflammatory.   -- Patient tolerating oral intake.  No diarrhea.  Abdomen soft.     Elevated AST  AST 87 on admission.  No history of alcohol use.  On chart review has had elevated AST in the past.  No further monitoring per patient's wishes     Thrombocytosis likely reactive improved  *.  Improving.  No further monitoring while on comfort care.     Macrocytosis   * 01/11.  Vitamin B12 within normal limits.     Recurrent Basal Cell Carcinoma of Right Pinna   *Mohs surgery 05/2023.   - Noted.      Osteoarthritis   *Shoulders, knees and hands. Hx of use of cane and wheelchair while ambulating.   *Limited mobility at baseline.   Discontinued PTA indomethacin  given renal disease  Tylenol as needed.     Gout   Continue PTA allopurinol.    Consultations This Hospital Stay   CORE CLINIC EVALUATION IP CONSULT  OCCUPATIONAL THERAPY ADULT IP CONSULT  NUTRITION SERVICES ADULT IP CONSULT  CARE MANAGEMENT / SOCIAL WORK IP CONSULT  PHYSICAL THERAPY ADULT IP CONSULT  CARDIOLOGY IP CONSULT  NUTRITION SERVICES ADULT IP CONSULT  SPEECH LANGUAGE PATH ADULT IP CONSULT  CARE MANAGEMENT / SOCIAL WORK IP CONSULT  PALLIATIVE CARE ADULT IP CONSULT  CARE MANAGEMENT / SOCIAL WORK IP CONSULT  PHYSICAL THERAPY ADULT IP CONSULT  CARE MANAGEMENT / SOCIAL WORK IP CONSULT    Code Status   No CPR- Do NOT Intubate    Time Spent on this Encounter   I, Long Mayfield MD, personally saw the patient today and spent greater than 30 minutes discharging this patient.       Long Mayfield MD  [facility] EXTENDED RECOVERY AND SHORT STAY  [address] [place] [id number]  Phone: [phone]  ______________________________________________________________________    Physical Exam   Vital Signs: Temp: 97.9  F (36.6  C) Temp src: Oral BP: 133/65 Pulse: 79   Resp: 18 SpO2: 93 % O2 Device: Nasal cannula Oxygen Delivery: 5 LPM  Weight: 206 lbs 12.66 oz  ----------------------------------------------------------------------------------------       Primary Care Physician   Damir Collins    Discharge Orders      Home Care Referral      Reason for your hospital stay    You had shortness of breath from pneumonia     Follow-up and recommended labs and tests     Follow up with primary care provider, Damir Collins, within 7 days for hospital follow- up.  No follow up labs or test are needed.     Activity    Your activity upon discharge: activity as tolerated     No CPR- Do NOT Intubate     Oxygen Adult/Peds    Oxygen Documentation  I certify that this patient, Eros Ontiveros has been under my care (or a nurse practitioner or physican's assistant working with me). This is the  face-to-face encounter for oxygen medical necessity.      At the time of this encounter, I have reviewed the qualifying testing and have determined that supplemental oxygen is reasonable and necessary and is expected to improve the patient's condition in a home setting.       Patient has continued oxygen desaturation due to Chronic Respiratory Failure with Hypoxia J96.11.    If portability is ordered, is the patient mobile within the home? yes     Diet    Follow this diet upon discharge: Orders Placed This Encounter      Snacks/Supplements Adult: Magic Cup; Between Meals      Snacks/Supplements Adult: Gelatein Plus; Between Meals      Room Service      Regular Diet Adult       Significant Results and Procedures   Most Recent 3 CBC's:  Recent Labs   Lab Test 01/13/24  0532 01/12/24  0626 01/11/24  1010 04/21/23  0355 04/21/23  0353   WBC  --  11.8* 11.9*  --  7.6   HGB 12.7 13.5 12.7   < > 15.6   MCV  --  106* 107*  --  103*   PLT  --  430 576*  --  273    < > = values in this interval not displayed.     Most Recent 3 BMP's:  Recent Labs   Lab Test 01/21/24  0631 01/13/24  0532 01/12/24  2338 01/12/24  1735 01/12/24  1205 01/12/24  0626 01/11/24  2214 01/11/24  1010   NA  --  149*  --   --   --  151*  --  149*   POTASSIUM 4.7 4.2  --   --   --  4.4  --  4.6   CHLORIDE  --  105  --   --   --  108*  --  109*   CO2  --  40*  --   --   --  36*  --  33*   BUN  --  32.1*  --   --   --  34.3*  --  41.5*   CR 0.87 1.25*  --   --   --  1.16*  --  1.28*   ANIONGAP  --  4*  --   --   --  7  --  7   ZAK  --  8.3  --   --   --  8.9  --  9.0   GLC  --  107* 126* 99   < > 78   < > 102*    < > = values in this interval not displayed.     Most Recent 2 LFT's:  Recent Labs   Lab Test 01/12/24  0626 01/11/24  1010   AST 71* 87*   ALT 24 28   ALKPHOS 70 80   BILITOTAL 0.4 0.4     Most Recent 3 INR's:No lab results found.  Most Recent 3 Troponin's:No lab results found.  Most Recent 3 BNP's:  Recent Labs   Lab Test 01/21/24  0631  01/11/24  1010   NTBNPI 1,367 9,857*     Most Recent D-dimer:No lab results found.  Most Recent Cholesterol Panel:No lab results found.  7-Day Micro Results       No results found for the last 168 hours.          Most Recent TSH and T4:  Recent Labs   Lab Test 01/11/24  1534   TSH 5.20*   T4 0.69*     Most Recent Hemoglobin A1c:No lab results found.  Most Recent 6 glucoses:  Recent Labs   Lab Test 01/13/24  0532 01/12/24  2338 01/12/24  1735 01/12/24  1303 01/12/24  1220 01/12/24  1205   * 126* 99 110* 76 57*     Most Recent Urinalysis:  Recent Labs   Lab Test 01/11/24  1742   COLOR Light Yellow   APPEARANCE Clear   URINEGLC Negative   URINEBILI Negative   URINEKETONE Negative   SG 1.022   UBLD Trace*   URINEPH 5.0   PROTEIN Negative   NITRITE Negative   LEUKEST Negative   RBCU 4*   WBCU 1     Most Recent ESR & CRP:No lab results found.,   Results for orders placed or performed during the hospital encounter of 01/11/24   XR Chest Port 1 View    Narrative    CHEST PORTABLE 1 VIEW   1/11/2024 10:24 AM     HISTORY: Cough x 3 weeks, dyspnea, hypoxia.    COMPARISON: 4/21/2023.      Impression    IMPRESSION: New dense consolidation at the right upper lobe. Correlate  with pneumonia versus other underlying airspace disease. Increased  consolidation at the left lung base as well that may also represent  airspace consolidation. Small bibasilar pleural fluid. Normal cardiac  silhouette. Bilateral shoulder DJD.    PRIYA BARRAZA MD         SYSTEM ID:  [id number]   CT Chest (PE) Abdomen Pelvis w Contrast    Narrative    CT CHEST PE ABDOMEN PELVIS W CONTRAST 1/11/2024 3:35 PM    CLINICAL HISTORY: Acute hypoxic respiratory failure; CAP; HF  TECHNIQUE: CT angiogram chest and routine CT abdomen pelvis with IV  contrast. Arterial phase through the chest and venous phase through  the abdomen and pelvis. 2D and 3D MIP reconstructions were preformed  by the CT technologist. Dose reduction techniques were used.     CONTRAST: 91mL  [id number]    COMPARISON: None.    FINDINGS:  ANGIOGRAM CHEST: Pulmonary arteries are normal caliber and negative  for pulmonary emboli. Thoracic aorta is negative for dissection. No CT  evidence of right heart strain.     LUNGS AND PLEURA: Right upper lobe consolidative opacity measuring 4.5  cm (series 3, image 47). Surrounding patchy, groundglass opacity in  the right lung apex and groundglass opacities in the right lower lobe.  Small bilateral pleural effusions, right greater than left. Mild  bronchial wall thickening in the lung bases, likely inflammatory. No  CT evidence for pulmonary edema.    MEDIASTINUM/AXILLAE: No lymphadenopathy. No thoracic aortic aneurysm.  Mild cardiomegaly. No coronary artery calcifications. No pericardial  effusion. Tiny hiatal hernia.    HEPATOBILIARY: No focal lesions of the liver. No calcified gallstones  or biliary ductal dilatation.    PANCREAS: Normal.    SPLEEN: Normal.    ADRENAL GLANDS: Normal.    KIDNEYS/BLADDER: Indeterminate 1.5 cm hyperdense lesion at the lower  pole anterior cortex of the right kidney (series 7, image 84). Tiny  nonobstructing left renal calculus measuring 2 mm. No hydronephrosis  or perinephric stranding bilaterally.    BOWEL: Mild wall thickening and enhancement in the cecum, ascending  colon and transverse colon may be inflammatory. No small bowel or  colonic obstruction. Sigmoid diverticulosis. Normal appendix.    LYMPH NODES: No lymphadenopathy in the abdomen and pelvis.    PELVIC ORGANS: Indeterminate calcified mass in the endometrium  measuring 2.0 cm. The endometrial lining is expanded to 3.7 cm with  nodular enhancement. Fibroid in the anterior fundus measuring 1.4 cm.    OTHER: No free fluid or fluid collections. No free air.    MUSCULOSKELETAL: Degenerative changes in the bilateral shoulders,  right hip 1and spine. No suspicious lesions in the bones.      Impression    IMPRESSION:  1.  No pulmonary emboli.  2.  Rounded area of consolidation  in the right upper lobe measuring  about 4.5 cm could represent pneumonia with a possible underlying  mass. Additional areas of groundglass and patchy opacities in the  right lung, likely due to pneumonia. Small bilateral pleural  effusions. Recommend a follow-up chest CT in 8-12 weeks to ensure  resolution.  3.  Possible mild colitis involving the cecum, ascending colon and  transverse colon, either infectious or inflammatory. Please correlate  clinically.  4.  Finding suspicious for endometrial malignancy with nodular and  calcified enhancing areas in the endometrium and endometrial canal  expanded to 37 mm. Pelvic ultrasound is suggested for further  evaluation.  5.  An indeterminate lesion of the lower pole of the right kidney  measuring 1.4 cm could represent a cyst with proteinaceous or  hemorrhagic material or solid mass. Nonemergent renal ultrasound is  suggested for further evaluation.    LAUREANO YOUNG MD         SYSTEM ID:  [id number]   Echocardiogram Complete     Value    LVEF  60-65%    Narrative    [id number]  [id number]  [id number]  [id number]^IJEOMA^DEV^SILVANA     [facility]  Echocardiography Laboratory  [address] [place] [address]     Name: LASHON COLLINS  MRN: [id number]  : 06/10/1933  Study Date: 2024 01:18 PM  Age: 89 yrs  Gender: Female  Patient Location: [facility]  Reason For Study: Heart Failure  Ordering Physician: DEV RAPHAEL  Referring Physician: Damir Collins  Performed By: Kelly Kaur     BSA: 1.8 m2  Height: 60 in  Weight: 180 lb  HR: 64  BP: 135/66 mmHg  ______________________________________________________________________________  Procedure  Complete Portable Echo Adult. Optison (NDC #[id number]) given intravenously.  ______________________________________________________________________________  Interpretation Summary     The left ventricle is normal in size.  There is severe concentric left ventricular hypertrophy, with evidence  for  asymmetric septal hypertrophy. Septal thickness measured up to 2.5 cm. LVOT  obstruction was not identified.  Left ventricular systolic function is normal.  The visual ejection fraction is 60-65%.  Diastolic Doppler findings (E/E' ratio and/or other parameters) suggest left  ventricular filling pressures are increased.  Moderate valvular aortic stenosis. Mean AV gradient 20 mmHg, RODRIGUEZ 1.4 cm2.  There is no comparison study available. Consider infiltrative CM and  hypertrophic CM.  ______________________________________________________________________________  Left Ventricle  The left ventricle is normal in size. There is severe concentric left  ventricular hypertrophy. Asymmetric septal hypertrophy. Septal thickness  measured up to 2.5 cm. LVOT obstruction was not identified. Left ventricular  systolic function is normal. The visual ejection fraction is 60-65%. Diastolic  Doppler findings (E/E' ratio and/or other parameters) suggest left ventricular  filling pressures are increased. Normal left ventricular wall motion.     Right Ventricle  The right ventricle is normal in structure, function and size.     Atria  Normal left atrial size. Right atrial size is normal. There is no atrial shunt  seen.     Mitral Valve  There is moderate mitral annular calcification. The mitral valve leaflets  appear thickened, but open well. There is trace mitral regurgitation.     Tricuspid Valve  The tricuspid valve is normal in structure and function. There is trace  tricuspid regurgitation. Right ventricular systolic pressure could not be  approximated due to inadequate tricuspid regurgitation. IVC diameter and  respiratory changes fall into an intermediate range suggesting an RA pressure  of 8 mmHg.     Aortic Valve  No aortic regurgitation is present. Moderate valvular aortic stenosis. The  calculated aortic valve are is 1.4 cm^2. The peak AoV pressure gradient is  36.0 mmHg. The mean AoV pressure gradient is 20.0 mmHg.      Pulmonic Valve  The pulmonic valve is not well seen, but is grossly normal. There is trace  pulmonic valvular regurgitation.     Vessels  Normal size aorta.     Pericardium  There is no pericardial effusion.     Rhythm  Sinus rhythm was noted.  ______________________________________________________________________________  MMode/2D Measurements & Calculations  IVSd: 1.3 cm     LVIDd: 4.0 cm  LVIDs: 2.6 cm  LVPWd: 1.2 cm  FS: 35.3 %  LV mass(C)d: 176.2 grams  LV mass(C)dI: 98.7 grams/m2  Ao root diam: 3.3 cm  LA dimension: 4.3 cm  asc Aorta Diam: 3.7 cm  LA/Ao: 1.3  LVOT diam: 2.1 cm  LVOT area: 3.5 cm2  Ao root diam index Ht(cm/m): 2.2  Ao root diam index BSA (cm/m2): 1.8  Asc Ao diam index BSA (cm/m2): 2.1  Asc Ao diam index Ht(cm/m): 2.4  RWT: 0.60  TAPSE: 2.1 cm     Doppler Measurements & Calculations  MV E max reynaldo: 79.1 cm/sec  MV A max reynaldo: 139.0 cm/sec  MV E/A: 0.57  MV dec time: 0.30 sec     Ao V2 max: 300.0 cm/sec  Ao max P.0 mmHg  Ao V2 mean: 205.0 cm/sec  Ao mean P.0 mmHg  Ao V2 VTI: 64.1 cm  RODRIGUEZ(I,D): 1.4 cm2  RODRIGUEZ(V,D): 1.3 cm2  LV V1 max P.0 mmHg  LV V1 max: 112.0 cm/sec  LV V1 VTI: 25.2 cm  SV(LVOT): 87.3 ml  SI(LVOT): 48.9 ml/m2  PA acc time: 0.12 sec  AV Reynaldo Ratio (DI): 0.37  RODRIGUEZ Index (cm2/m2): 0.76  E/E' av.0  Lateral E/e': 18.2  Medial E/e': 17.7  RV S Reynaldo: 12.2 cm/sec     ______________________________________________________________________________  Report approved by: Araseli Torre 2024 03:09 PM         Echocardiogram Limited     Value    LVEF  65-70%    Narrative    [id number]  [id number]  [id number]  [id number]^CITLALI^CORBY     [facility]  Echocardiography Laboratory  [address] [place] [address]     Name: LASHON COLLINS  MRN: [id number]  : 06/10/1933  Study Date: 2024 07:42 AM  Age: 89 yrs  Gender: Female  Patient Location: [facility]  Reason For Study: Amyloidosis  Ordering Physician: CORBY GODWIN  Referring Physician: CITLALI  CORBY  Performed By: Shaina Canela     BSA: 1.7 m2  Height: 60 in  Weight: 167 lb  HR: 50  BP: 106/72 mmHg  ______________________________________________________________________________  Procedure  Limited Echo Adult.  ______________________________________________________________________________  Interpretation Summary     Hyperdynamic left ventricular function  The visual ejection fraction is 65-70%.  There is moderate concentric left ventricular hypertrophy.  Septal thickness in diastole measures 1.3 cm.  Global peak LV longitudinal strain is averaged at -22.8%. This is within  reported normal limits (normal <-18%). There is no regional variation in  strain pattern consistent with cardiac amyloidosis pattern. CMR and PYP scan  can be considered for definitive diagnosis.  The right ventricular systolic function is normal.  The left atrium is severely dilated.  Moderate valvular aortic stenosis.  The inferior vena cava was normal in size with preserved respiratory  variability.  There is no pericardial effusion.     Similar findings to study dated 1/11/2024 except septal measurements not as  severe. As above, consider cardiac MRI. Strain performed today is within  normal limits with no regional variation / apical sparing pattern.  ______________________________________________________________________________  Left Ventricle  The left ventricle is normal in size. There is moderate concentric left  ventricular hypertrophy. Septal thickness measures 1.3 cm. Echo findings are  not consistent with left ventricular outflow obstruction. Hyperdynamic left  ventricular function. The visual ejection fraction is 65-70%. Grade II or  moderate diastolic dysfunction. Global peak LV longitudinal strain is averaged  at -22.8%. This is within reported normal limits (normal <-18%). Normal left  ventricular wall motion.     Right Ventricle  The right ventricle is normal size. The right ventricular systolic function  is  normal.     Atria  The left atrium is severely dilated. Right atrium not well visualized.     Mitral Valve  There is moderate mitral annular calcification. There is mild (1+) mitral  regurgitation.     Tricuspid Valve  The tricuspid valve is normal in structure and function. There is mild (1+)  tricuspid regurgitation. The right ventricular systolic pressure is  approximated at 35mmHg plus the right atrial pressure.     Aortic Valve  The aortic valve is trileaflet with aortic valve sclerosis. There is mild (1+)  aortic regurgitation. The mean AoV pressure gradient is 26.1 mmHg. The  calculated aortic valve are is 1.2 cm^2. Moderate valvular aortic stenosis.     Pulmonic Valve  The pulmonic valve is normal in structure and function. There is mild (1+)  pulmonic valvular regurgitation.     Vessels  The aortic root is normal size. Normal size ascending aorta. The inferior vena  cava was normal in size with preserved respiratory variability.     Pericardium  There is no pericardial effusion.     Rhythm  The rhythm was sinus bradycardia.  ______________________________________________________________________________  MMode/2D Measurements & Calculations  IVSd: 1.3 cm     LVIDd: 4.2 cm  LVIDs: 2.0 cm  LVPWd: 0.99 cm  FS: 53.0 %  LV mass(C)d: 165.4 grams  LV mass(C)dI: 95.7 grams/m2  Ao root diam: 3.2 cm  asc Aorta Diam: 3.7 cm  LVOT diam: 2.0 cm  LVOT area: 3.1 cm2  Ao root diam index Ht(cm/m): 2.1  Ao root diam index BSA (cm/m2): 1.9  Asc Ao diam index BSA (cm/m2): 2.1  Asc Ao diam index Ht(cm/m): 2.4  LA Volume (BP): 98.5 ml     LA Volume Index (BP): 56.9 ml/m2  RV Base: 3.7 cm  RWT: 0.47  TAPSE: 2.3 cm     Doppler Measurements & Calculations  MV E max emily: 119.0 cm/sec  MV A max emily: 155.6 cm/sec  MV E/A: 0.76  MV max P.9 mmHg  MV mean P.2 mmHg  MV V2 VTI: 55.2 cm  MVA(VTI): 1.6 cm2  MV P1/2t max emily: 120.3 cm/sec  MV P1/2t: 100.7 msec  MVA(P1/2t): 2.2 cm2  MV dec slope: 350.1 cm/sec2  MV dec time: 0.30  sec  Ao V2 max: 336.1 cm/sec  Ao max P.0 mmHg  Ao V2 mean: 242.4 cm/sec  Ao mean P.1 mmHg  Ao V2 VTI: 76.8 cm  RODRIGUEZ(I,D): 1.2 cm2  RODRIGUEZ(V,D): 1.2 cm2  LV V1 max P.3 mmHg  LV V1 max: 125.8 cm/sec  LV V1 VTI: 29.1 cm  SV(LVOT): 89.6 ml  SI(LVOT): 51.8 ml/m2  PA V2 max: 94.6 cm/sec  PA max PG: 3.6 mmHg  PA acc time: 0.08 sec  TR max reynaldo: 299.4 cm/sec  TR max P.9 mmHg  AV Reynaldo Ratio (DI): 0.37     RODRIGUEZ Index (cm2/m2): 0.67  E/E' av.2  Lateral E/e': 16.8  Medial E/e': 17.6     Measurements from [employer]  LV GLS Endo Peak A2C (AS): -21.8 %  LV GLS Endo Peak A3C (AS): -23.1 %  LV GLS Endo Peak A4C (AS): -23.5 %  LV GLS Endo Peak Avg (AS): -22.8 %     ______________________________________________________________________________  Report approved by: Araseli Machado 2024 01:01 PM             Discharge Medications   Discharge Medication List as of 2024  9:49 AM        CONTINUE these medications which have CHANGED    Details   benzonatate (TESSALON) 100 MG capsule Take 1 capsule (100 mg) by mouth 3 times daily as needed for cough, Disp-30 capsule, R-0, E-Prescribe      cefdinir (OMNICEF) 300 MG capsule Take 1 capsule (300 mg) by mouth every 12 hours, Disp-14 capsule, R-0, E-Prescribe      torsemide (DEMADEX) 10 MG tablet Take 1 tablet (10 mg) by mouth daily, Disp-30 tablet, R-0, E-Prescribe           CONTINUE these medications which have NOT CHANGED    Details   albuterol (PROAIR HFA) 108 (90 Base) MCG/ACT inhaler Inhale 2 puffs into the lungs every 4 hours as needed for shortness of breath, Disp-18 g, R-0, E-Prescribe      allopurinol (ZYLOPRIM) 300 MG tablet Take 300 mg by mouth daily, Historical           STOP taking these medications       indomethacin (INDOCIN) 25 MG capsule Comments:   Reason for Stopping:             Allergies   Allergies   Allergen Reactions    Penicillins Unknown

## 2024-01-22 NOTE — PROGRESS NOTES
Care Management Discharge Note    Discharge Date: 01/22/2024       Discharge Disposition: Assisted Living    Discharge Services: Transportation Services    Discharge DME: None    Discharge Transportation:  [employer] stretcher [phone]    Private pay costs discussed: Not applicable    Does the patient's insurance plan have a 3 day qualifying hospital stay waiver?  Yes     Which insurance plan 3 day waiver is available? Alternative insurance waiver    Will the waiver be used for post-acute placement? No    PAS Confirmation Code:  N/A  Patient/family educated on Medicare website which has current facility and service quality ratings: yes    Education Provided on the Discharge Plan: Yes  Persons Notified of Discharge Plans: MD, Charge RN, granddaughter  Patient/Family in Agreement with the Plan: other (see comments) (Family is wanting hospice, patient's spouse is wanting resortative care.)    Handoff Referral Completed: No    Additional Information:  Rescheduled transport to stretcher ride instead of wheelchair, as charge RN and bedside RN feel pt is not able to manage oxygen on ride. Transport is et for [phone] today. Call to Allison RN, [phone] at [facility], confirmed they are able to accept pt today. Updated on transport time. Orders need to be faxed to her at [phone]. They use [facility] in [place] for pharmacy. [facility] sent home care referral, per request of MD. Spoke with daughter Theresa, confirmed discharge plan. Updated on change of transport. Per charge RN and hospitalist, spouse is wanting update from  and is upset. Asked Theresa about this, she confirmed spouse has been updated, but spouse is very confused and has dementia. There is a room for spouse at facility but spouse has not been agreeable for this, spouse feels pt needs rehab and wants pt to go to TCU, which is not the appropriate discharge plan, they  have informational meeting with [facility] this week. Theresa  confirmed spouse has been up to date on all plans and no need to talk with him, Theresa apologized if spouse has been acting out to staff. [facility] faxed discharge orders.     BRANDO Figueroa  Social Work  [facility]

## 2024-01-22 NOTE — PLAN OF CARE
Goal Outcome Evaluation:    PRIMARY Concern:  CHF exacerbation, CAP   SAFETY RISK Concerns (fall risk, behaviors, etc.):  Fall, aspiration      Isolation/Type:  NA  Tests/Procedures for NEXT shift:  AM labs   Consults? (Pending/following, signed-off?)  SW   Where is patient from? (Home, TCU, etc.):  LTC  Other Important info for NEXT shift:  hospice to meet with patient at LTC  Orientation/Cognitive: A&Ox4, forgetful   Observation Goals (Met/ Not Met):  Inpatient   Mobility Level/Assist Equipment:  A1 w/ GB/W  Antibiotics & Plan (IV/po, length of tx left):  PO omnicef   Pain Management:  Denies pain   Tele/VS/O2:  VSS, 3L NC overnight, 2L which is pt. Baseline   ABNL Lab/BG:  NA  Diet:  Reg Diet   Bowel/Bladder: Continent    Skin Concerns:  Has rash in abd folds, mepilex on right buttock. Small skin tear to right buttock.   Drains/Devices: Purewick in place.   Reviewed discharge instruction with the pt and pt discharged to [facility].

## 2024-01-22 NOTE — ED PROVIDER NOTES
"    History     Chief Complaint:  oxygen need       MAHESH Ontiveros is a 89 year old female presenting from a care facility with the need for oxygen.  The patient was discharged from the hospital today after an 11-day stay, upon transport to the transitional care unit it was noted that the oxygen had not been delivered yet therefore EMS brought the patient back to the emergency department.  The patient has no complaints at this time and is requesting discharge to the facility when the oxygen is available.      Independent Historian:    None    Review of External Notes:  Discharge summary from today was reviewed demonstrating recent admission for respiratory failure, pneumonia, and congestive heart failure    Medications:    albuterol (PROAIR HFA) 108 (90 Base) MCG/ACT inhaler  allopurinol (ZYLOPRIM) 300 MG tablet  benzonatate (TESSALON) 100 MG capsule  cefdinir (OMNICEF) 300 MG capsule  torsemide (DEMADEX) 10 MG tablet        Past Medical History:    Past Medical History:   Diagnosis Date    Arthritis    Acute hypoxemic and hypercarbic respiratory failure  Acute diastolic heart failure  Community-acquired pneumonia  Possible sleep apnea  Encephalopathy  Acute kidney injury  Concern for endometrial malignancy  Osteoarthritis  Gout    Past Surgical History:     Surgical History  Surgery Date Site/Laterality Comments   EYE SURGERY         SKIN CANCER EXCISION   Right ear          Physical Exam   Patient Vitals for the past 24 hrs:   BP Temp Temp src Pulse Resp SpO2 Height   01/22/24 1438 131/67 97.3  F (36.3  C) Oral 70 18 93 % 1.499 m (4' 11\")        Physical Exam  General: Alert, interactive   Head:  Scalp is atraumatic  Eyes:  The pupils are equal, round, and reactive to light    EOM's intact    No scleral icterus  ENT:      Nose:  The external nose is normal  Ears:  External ears are normal  Mouth/Throat: The oropharynx is normal    Mucus membranes are dry      Neck:  Normal range of motion.      There is no " rigidity.    Trachea is in the midline         CV:  Regular rate and rhythm    No murmur   Resp:  Coarse breath sounds bilaterally       MS:  Normal strength in all 4 extremities  Skin:  Warm and dry, No rash or lesions noted.    Psych: Awake. Alert.  Normal affect.      Appropriate interactions.  Emergency Department Course   Emergency Department Course & Assessments:    Interventions:  Medications - No data to display     Assessments:  Patient was evaluated at the bedside    Independent Interpretation (X-rays, CTs, rhythm strip):  None    Consultations/Discussion of Management or Tests:   assisted with arranging oxygen for the patient       Social Determinants of Health affecting care:  None     Disposition:  The patient was discharged to home.     Impression & Plan    Medical Decision Making:  Patient presents as a bounce back from her TCU, she was discharged from the inpatient unit today and her oxygen had not been delivered yet.  Subsequently she was brought back here maintained on her baseline oxygen while arrangements were made for the oxygen to be delivered she has no complaints and is requesting discharge to the facility.  This was undertaken she was discharged via EMS.    Diagnosis:    ICD-10-CM    1. Hypoxia  R09.02              Ervin Estevez MD  1/22/2024   Trigger, Ervin Price,*              Trigger, Ervin Price MD  01/22/24 1738

## 2024-01-22 NOTE — PLAN OF CARE
PRIMARY Concern:  CHF exacerbation, CAP   SAFETY RISK Concerns (fall risk, behaviors, etc.):  Fall, aspiration      Isolation/Type:  NA  Tests/Procedures for NEXT shift:  AM labs   Consults? (Pending/following, signed-off?)  SW   Where is patient from? (Home, TCU, etc.):  LTC  Other Important info for NEXT shift:  hospice to meet with patient at LTC  Anticipated DC date & active delays:1/22 back to senior living; currently set to get wheelchair ride between [phone].  _____________________________________________________________________________  SUMMARY NOTE:          Orientation/Cognitive: A&Ox4, forgetful   Observation Goals (Met/ Not Met):  Inpatient   Mobility Level/Assist Equipment:  A1 GB/walker  Antibiotics & Plan (IV/po, length of tx left):  PO omnicef   Pain Management:  denies pain   Tele/VS/O2:  VSS, 2L NC which is pt. Baseline   ABNL Lab/BG:  NA  Diet:  reg  Bowel/Bladder:  incontinent at times, ambulates to BR  Skin Concerns:  rash in abd folds, mepalex on right buttock. Small skin tear to right buttock.   Drains/Devices: NA  Patient Stated Goal for Today: NA

## 2024-01-22 NOTE — ED TRIAGE NOTES
Patient returned to hospital since oxygen was not ready at the facility. Discharged from station 55 around 11:30. Oxygen supply will get to the facility at 4 pm. Ride shoulder be arranged at 3:30 pm, then call [facility] @ [phone].    Triage Assessment (Adult)       Row Name 01/22/24 1437          Triage Assessment    Airway WDL WDL        Respiratory WDL    Respiratory WDL X  Patient has been discharged to SNF on oxygen but oxygen supply has not arrived in the facility.

## 2024-01-22 NOTE — ED NOTES
Bed: FT04  Expected date:   Expected time:   Means of arrival:   Comments:  [facility] 89F needs oxygen

## 2024-01-22 NOTE — PLAN OF CARE
Goal Outcome Evaluation:  PRIMARY Concern:  CHF exacerbation, CAP   SAFETY RISK Concerns (fall risk, behaviors, etc.):  Fall, aspiration      Isolation/Type:  NA  Tests/Procedures for NEXT shift:  AM labs   Consults? (Pending/following, signed-off?)  SW   Where is patient from? (Home, TCU, etc.):  LTC  Other Important info for NEXT shift:  hospice to meet with patient at LTC  Anticipated DC date & active delays:1/22 back to senior living; currently set to get wheelchair ride between [id number].  _____________________________________________________________________________  SUMMARY NOTE  Shift 01/21-01/22/24 8821-4431  Orientation/Cognitive: A&Ox4, forgetful   Observation Goals (Met/ Not Met):  Inpatient   Mobility Level/Assist Equipment:  A1 w/ GB/W  Antibiotics & Plan (IV/po, length of tx left):  PO omnicef   Pain Management:  Denies pain   Tele/VS/O2:  VSS, 3L NC overnight, 2L which is pt. Baseline   ABNL Lab/BG:  NA  Diet:  Reg Diet   Bowel/Bladder:  Incontinent this shift, Had one BM, Does ambulate to BR in days  Skin Concerns:  Has rash in abd folds, mepilex on right buttock. Small skin tear to right buttock.   Drains/Devices: Purewick in place.   Patient Stated Goal for Today: NA